# Patient Record
Sex: FEMALE | Race: WHITE | HISPANIC OR LATINO | URBAN - METROPOLITAN AREA
[De-identification: names, ages, dates, MRNs, and addresses within clinical notes are randomized per-mention and may not be internally consistent; named-entity substitution may affect disease eponyms.]

---

## 2017-08-21 ENCOUNTER — INPATIENT (INPATIENT)
Facility: HOSPITAL | Age: 62
LOS: 5 days | Discharge: HOME | DRG: 603 | End: 2017-08-27
Attending: HOSPITALIST | Admitting: INTERNAL MEDICINE
Payer: MEDICAID

## 2017-08-21 VITALS
HEART RATE: 87 BPM | OXYGEN SATURATION: 98 % | RESPIRATION RATE: 18 BRPM | DIASTOLIC BLOOD PRESSURE: 87 MMHG | TEMPERATURE: 99 F | SYSTOLIC BLOOD PRESSURE: 142 MMHG

## 2017-08-21 PROCEDURE — 99285 EMERGENCY DEPT VISIT HI MDM: CPT | Mod: 25

## 2017-08-21 PROCEDURE — 10060 I&D ABSCESS SIMPLE/SINGLE: CPT

## 2017-08-21 RX ADMIN — Medication 100 MILLIGRAM(S): at 23:38

## 2017-08-21 NOTE — ED PROCEDURE NOTE - ATTENDING CONTRIBUTION TO CARE
I have participated in and supervised all key portions of the above procedures and agree with the above documentation. CA Almonte MD

## 2017-08-21 NOTE — ED PROCEDURE NOTE - PROCEDURE ADDITIONAL DETAILS
procedure performed with static ultrasound guidance; images are available in [employer] quality review software.    Fani

## 2017-08-22 ENCOUNTER — TRANSCRIPTION ENCOUNTER (OUTPATIENT)
Age: 62
End: 2017-08-22

## 2017-08-22 DIAGNOSIS — E87.1 HYPO-OSMOLALITY AND HYPONATREMIA: ICD-10-CM

## 2017-08-22 DIAGNOSIS — L03.90 CELLULITIS, UNSPECIFIED: ICD-10-CM

## 2017-08-22 DIAGNOSIS — Z29.9 ENCOUNTER FOR PROPHYLACTIC MEASURES, UNSPECIFIED: ICD-10-CM

## 2017-08-22 DIAGNOSIS — R73.9 HYPERGLYCEMIA, UNSPECIFIED: ICD-10-CM

## 2017-08-22 DIAGNOSIS — L03.116 CELLULITIS OF LEFT LOWER LIMB: ICD-10-CM

## 2017-08-22 LAB
ALBUMIN SERPL ELPH-MCNC: 3.9 G/DL — SIGNIFICANT CHANGE UP (ref 3.3–5)
ALP SERPL-CCNC: 77 U/L — SIGNIFICANT CHANGE UP (ref 40–120)
ALT FLD-CCNC: 15 U/L RC — SIGNIFICANT CHANGE UP (ref 10–45)
ANION GAP SERPL CALC-SCNC: 14 MMOL/L — SIGNIFICANT CHANGE UP (ref 5–17)
ANION GAP SERPL CALC-SCNC: 16 MMOL/L — SIGNIFICANT CHANGE UP (ref 5–17)
APPEARANCE UR: CLEAR — SIGNIFICANT CHANGE UP
AST SERPL-CCNC: 13 U/L — SIGNIFICANT CHANGE UP (ref 10–40)
BASOPHILS # BLD AUTO: 0.1 K/UL — SIGNIFICANT CHANGE UP (ref 0–0.2)
BASOPHILS NFR BLD AUTO: 0.6 % — SIGNIFICANT CHANGE UP (ref 0–2)
BILIRUB SERPL-MCNC: 0.4 MG/DL — SIGNIFICANT CHANGE UP (ref 0.2–1.2)
BILIRUB UR-MCNC: NEGATIVE — SIGNIFICANT CHANGE UP
BUN SERPL-MCNC: 11 MG/DL — SIGNIFICANT CHANGE UP (ref 7–23)
BUN SERPL-MCNC: 9 MG/DL — SIGNIFICANT CHANGE UP (ref 7–23)
CALCIUM SERPL-MCNC: 8.8 MG/DL — SIGNIFICANT CHANGE UP (ref 8.4–10.5)
CALCIUM SERPL-MCNC: 9.4 MG/DL — SIGNIFICANT CHANGE UP (ref 8.4–10.5)
CHLORIDE SERPL-SCNC: 93 MMOL/L — LOW (ref 96–108)
CHLORIDE SERPL-SCNC: 98 MMOL/L — SIGNIFICANT CHANGE UP (ref 96–108)
CO2 SERPL-SCNC: 22 MMOL/L — SIGNIFICANT CHANGE UP (ref 22–31)
CO2 SERPL-SCNC: 26 MMOL/L — SIGNIFICANT CHANGE UP (ref 22–31)
COLOR SPEC: YELLOW — SIGNIFICANT CHANGE UP
CREAT SERPL-MCNC: 0.68 MG/DL — SIGNIFICANT CHANGE UP (ref 0.5–1.3)
CREAT SERPL-MCNC: 0.81 MG/DL — SIGNIFICANT CHANGE UP (ref 0.5–1.3)
DIFF PNL FLD: NEGATIVE — SIGNIFICANT CHANGE UP
EOSINOPHIL # BLD AUTO: 0.4 K/UL — SIGNIFICANT CHANGE UP (ref 0–0.5)
EOSINOPHIL NFR BLD AUTO: 3.5 % — SIGNIFICANT CHANGE UP (ref 0–6)
GLUCOSE SERPL-MCNC: 246 MG/DL — HIGH (ref 70–99)
GLUCOSE SERPL-MCNC: 345 MG/DL — HIGH (ref 70–99)
GLUCOSE UR QL: 500 MG/DL
HCT VFR BLD CALC: 41.3 % — SIGNIFICANT CHANGE UP (ref 34.5–45)
HCT VFR BLD CALC: 42.9 % — SIGNIFICANT CHANGE UP (ref 34.5–45)
HGB BLD-MCNC: 13.6 G/DL — SIGNIFICANT CHANGE UP (ref 11.5–15.5)
HGB BLD-MCNC: 14.2 G/DL — SIGNIFICANT CHANGE UP (ref 11.5–15.5)
KETONES UR-MCNC: ABNORMAL
LEUKOCYTE ESTERASE UR-ACNC: NEGATIVE — SIGNIFICANT CHANGE UP
LYMPHOCYTES # BLD AUTO: 2.9 K/UL — SIGNIFICANT CHANGE UP (ref 1–3.3)
LYMPHOCYTES # BLD AUTO: 28.5 % — SIGNIFICANT CHANGE UP (ref 13–44)
MCHC RBC-ENTMCNC: 29.1 PG — SIGNIFICANT CHANGE UP (ref 27–34)
MCHC RBC-ENTMCNC: 29.4 PG — SIGNIFICANT CHANGE UP (ref 27–34)
MCHC RBC-ENTMCNC: 33 GM/DL — SIGNIFICANT CHANGE UP (ref 32–36)
MCHC RBC-ENTMCNC: 33.1 GM/DL — SIGNIFICANT CHANGE UP (ref 32–36)
MCV RBC AUTO: 88 FL — SIGNIFICANT CHANGE UP (ref 80–100)
MCV RBC AUTO: 89 FL — SIGNIFICANT CHANGE UP (ref 80–100)
MONOCYTES # BLD AUTO: 0.7 K/UL — SIGNIFICANT CHANGE UP (ref 0–0.9)
MONOCYTES NFR BLD AUTO: 7 % — SIGNIFICANT CHANGE UP (ref 2–14)
NEUTROPHILS # BLD AUTO: 6.1 K/UL — SIGNIFICANT CHANGE UP (ref 1.8–7.4)
NEUTROPHILS NFR BLD AUTO: 60.5 % — SIGNIFICANT CHANGE UP (ref 43–77)
NITRITE UR-MCNC: NEGATIVE — SIGNIFICANT CHANGE UP
PH UR: 7 — SIGNIFICANT CHANGE UP (ref 5–8)
PLATELET # BLD AUTO: 216 K/UL — SIGNIFICANT CHANGE UP (ref 150–400)
PLATELET # BLD AUTO: 224 K/UL — SIGNIFICANT CHANGE UP (ref 150–400)
POTASSIUM SERPL-MCNC: 3.9 MMOL/L — SIGNIFICANT CHANGE UP (ref 3.5–5.3)
POTASSIUM SERPL-MCNC: 3.9 MMOL/L — SIGNIFICANT CHANGE UP (ref 3.5–5.3)
POTASSIUM SERPL-SCNC: 3.9 MMOL/L — SIGNIFICANT CHANGE UP (ref 3.5–5.3)
POTASSIUM SERPL-SCNC: 3.9 MMOL/L — SIGNIFICANT CHANGE UP (ref 3.5–5.3)
PROT SERPL-MCNC: 7.4 G/DL — SIGNIFICANT CHANGE UP (ref 6–8.3)
PROT UR-MCNC: NEGATIVE MG/DL — SIGNIFICANT CHANGE UP
RBC # BLD: 4.63 M/UL — SIGNIFICANT CHANGE UP (ref 3.8–5.2)
RBC # BLD: 4.88 M/UL — SIGNIFICANT CHANGE UP (ref 3.8–5.2)
RBC # FLD: 11.3 % — SIGNIFICANT CHANGE UP (ref 10.3–14.5)
RBC # FLD: 11.4 % — SIGNIFICANT CHANGE UP (ref 10.3–14.5)
SODIUM SERPL-SCNC: 131 MMOL/L — LOW (ref 135–145)
SODIUM SERPL-SCNC: 138 MMOL/L — SIGNIFICANT CHANGE UP (ref 135–145)
SP GR SPEC: 1.01 — LOW (ref 1.01–1.02)
UROBILINOGEN FLD QL: NEGATIVE MG/DL — SIGNIFICANT CHANGE UP
WBC # BLD: 10.1 K/UL — SIGNIFICANT CHANGE UP (ref 3.8–10.5)
WBC # BLD: 9.6 K/UL — SIGNIFICANT CHANGE UP (ref 3.8–10.5)
WBC # FLD AUTO: 10.1 K/UL — SIGNIFICANT CHANGE UP (ref 3.8–10.5)
WBC # FLD AUTO: 9.6 K/UL — SIGNIFICANT CHANGE UP (ref 3.8–10.5)

## 2017-08-22 PROCEDURE — 99223 1ST HOSP IP/OBS HIGH 75: CPT

## 2017-08-22 RX ORDER — INSULIN LISPRO 100/ML
VIAL (ML) SUBCUTANEOUS
Qty: 0 | Refills: 0 | Status: DISCONTINUED | OUTPATIENT
Start: 2017-08-22 | End: 2017-08-24

## 2017-08-22 RX ORDER — HEPARIN SODIUM 5000 [USP'U]/ML
5000 INJECTION INTRAVENOUS; SUBCUTANEOUS EVERY 8 HOURS
Qty: 0 | Refills: 0 | Status: DISCONTINUED | OUTPATIENT
Start: 2017-08-22 | End: 2017-08-27

## 2017-08-22 RX ORDER — INSULIN LISPRO 100/ML
5 VIAL (ML) SUBCUTANEOUS
Qty: 0 | Refills: 0 | Status: DISCONTINUED | OUTPATIENT
Start: 2017-08-22 | End: 2017-08-24

## 2017-08-22 RX ORDER — SODIUM CHLORIDE 9 MG/ML
1000 INJECTION INTRAMUSCULAR; INTRAVENOUS; SUBCUTANEOUS ONCE
Qty: 0 | Refills: 0 | Status: COMPLETED | OUTPATIENT
Start: 2017-08-22 | End: 2017-08-22

## 2017-08-22 RX ORDER — VANCOMYCIN HCL 1 G
1250 VIAL (EA) INTRAVENOUS EVERY 12 HOURS
Qty: 0 | Refills: 0 | Status: DISCONTINUED | OUTPATIENT
Start: 2017-08-22 | End: 2017-08-23

## 2017-08-22 RX ORDER — INSULIN GLARGINE 100 [IU]/ML
15 INJECTION, SOLUTION SUBCUTANEOUS AT BEDTIME
Qty: 0 | Refills: 0 | Status: DISCONTINUED | OUTPATIENT
Start: 2017-08-22 | End: 2017-08-24

## 2017-08-22 RX ORDER — INSULIN LISPRO 100/ML
VIAL (ML) SUBCUTANEOUS AT BEDTIME
Qty: 0 | Refills: 0 | Status: DISCONTINUED | OUTPATIENT
Start: 2017-08-22 | End: 2017-08-27

## 2017-08-22 RX ORDER — ACETAMINOPHEN 500 MG
650 TABLET ORAL EVERY 6 HOURS
Qty: 0 | Refills: 0 | Status: DISCONTINUED | OUTPATIENT
Start: 2017-08-22 | End: 2017-08-27

## 2017-08-22 RX ADMIN — HEPARIN SODIUM 5000 UNIT(S): 5000 INJECTION INTRAVENOUS; SUBCUTANEOUS at 16:20

## 2017-08-22 RX ADMIN — Medication 166.67 MILLIGRAM(S): at 18:11

## 2017-08-22 RX ADMIN — Medication 2: at 21:48

## 2017-08-22 RX ADMIN — Medication 5 UNIT(S): at 12:14

## 2017-08-22 RX ADMIN — SODIUM CHLORIDE 1000 MILLILITER(S): 9 INJECTION INTRAMUSCULAR; INTRAVENOUS; SUBCUTANEOUS at 01:28

## 2017-08-22 RX ADMIN — Medication 5 UNIT(S): at 18:07

## 2017-08-22 RX ADMIN — INSULIN GLARGINE 15 UNIT(S): 100 INJECTION, SOLUTION SUBCUTANEOUS at 22:54

## 2017-08-22 RX ADMIN — Medication 6: at 12:16

## 2017-08-22 RX ADMIN — Medication 166.67 MILLIGRAM(S): at 09:30

## 2017-08-22 RX ADMIN — Medication 2: at 18:10

## 2017-08-22 RX ADMIN — HEPARIN SODIUM 5000 UNIT(S): 5000 INJECTION INTRAVENOUS; SUBCUTANEOUS at 21:48

## 2017-08-22 RX ADMIN — Medication 4: at 09:48

## 2017-08-22 NOTE — DISCHARGE NOTE ADULT - PLAN OF CARE
Bactrim DS BID until 9/2/17  Follow up with your primary care MD Improved.  US with no abscess or DVT, S/p IV Vancomycin with ID following. Improved BS levels. continue diabetic medications  follow up with your pmd continue diabetic medications  follow up with Dr. Cervantes upon discharge.

## 2017-08-22 NOTE — DISCHARGE NOTE ADULT - CARE PROVIDER_API CALL
Dylon Cervantes (DO), Internal Medicine  [address] [place] [address]  Phone: [phone]  Fax: [phone]

## 2017-08-22 NOTE — H&P ADULT - HISTORY OF PRESENT ILLNESS
This is a 60 y/o female with h/o allergy induced asthma who presented with left thigh erythema for 4 days. Pt travels frequently was in [place] a week ago and sustained several bug bites on her left lower extremity in addition to one on her left shin. The patient thought nothing of them, but did notice they took longer to go away than usual. The one on her left shin became more red, hot and swollen and developed a scab over it. She then traveled to [place] when on Saturday she believes she may have received another bug bite, but this time was on the upper portion of her left thigh. It soon developed increased swelling, expanding erythema and warmth. She james a [place] around the area, and when the erythema expanded outside of the area she went to a urgent care facility. She was prescribed Bactrim, the first dose when she took Sunday evening. She took Bactrim again on Monday morning, took a flight back to [place], and when she landed, felt as though the erythema continued to expand and went to another urgent care who sent her to the ED for evaluation. Pt denied systemic symptoms, no fevers, chills, night sweats, N/V, palpitations. Additionally, pt reported that she had been diagnosed with pre-diabetes 20 years ago, was started on Metformin, lost 60 lbs and was taken off the medication.

## 2017-08-22 NOTE — ED PROVIDER NOTE - MEDICAL DECISION MAKING DETAILS
62 yo F with no known pmh, c/o redness on left thigh and shin at sites of bug bites; thigh has become more red/warm to touch; on exam, has palpable fluctuance with significant area of erythema on left anterior thigh; smaller area with no fluctuance around left shin; concerning for cellulitis; small abscess (will perofrm I&D) and will start clindamycin (failure of po abx with bactrim).  will check labs given multiple areas of skin infections; r/o DM.

## 2017-08-22 NOTE — DISCHARGE NOTE ADULT - ADDITIONAL INSTRUCTIONS
Make appointment with your PMD in 1 week Make appointment with your PMD in 1 week.  Patient will find a local PMD and will make an appointment,

## 2017-08-22 NOTE — H&P ADULT - PROBLEM SELECTOR PLAN 1
Pt presented with erythema, pain, and tenderness of left anterior thigh approx [id number], demarcated after sustaining a bug bite to the area. Pt took 2 doses of Bactrim DS with continued expansion of erythema. A/W inguinal lymphadenopathy. No systemic symptoms. Afebrile, no elevation in WBC. s/p I/D in ED with drainage of blood. Received Clindamycin.   - Will treat as purulent cellulitis, start Vancomycin 1250mg Q12. Check trough prior to 4th dose  - f/u bcx  - Tylenol prn fever

## 2017-08-22 NOTE — DISCHARGE NOTE ADULT - CARE PLAN
Principal Discharge DX:	Cellulitis and abscess  Goal:	Improved.  US with no abscess or DVT, S/p IV Vancomycin with ID following.  Instructions for follow-up, activity and diet:	Bactrim DS BID until 9/2/17  Follow up with your primary care MD  Secondary Diagnosis:	Diabetes mellitus  Goal:	Improved BS levels.  Instructions for follow-up, activity and diet:	continue diabetic medications  follow up with your pmd Principal Discharge DX:	Cellulitis and abscess  Goal:	Improved.  US with no abscess or DVT, S/p IV Vancomycin with ID following.  Instructions for follow-up, activity and diet:	Bactrim DS BID until 9/2/17  Follow up with your primary care MD  Secondary Diagnosis:	Diabetes mellitus  Goal:	Improved BS levels.  Instructions for follow-up, activity and diet:	continue diabetic medications  follow up with Dr. Cervantes upon discharge.

## 2017-08-22 NOTE — H&P ADULT - ASSESSMENT
62 y/o female with h/o allergy induced asthma who presented with left thigh erythema for 4 days c/w cellulitis

## 2017-08-22 NOTE — H&P ADULT - NSHPLABSRESULTS_GEN_ALL_CORE
.  LABS:                         14.2   10.1  )-----------( 224      ( 21 Aug 2017 23:30 )             42.9     08-21    131<L>  |  93<L>  |  11  ----------------------------<  345<H>  3.9   |  22  |  0.81    Ca    9.4      21 Aug 2017 23:30    TPro  7.4  /  Alb  3.9  /  TBili  0.4  /  DBili  x   /  AST  13  /  ALT  15  /  AlkPhos  77  08-21                  RADIOLOGY, EKG & ADDITIONAL TESTS: EKG personally reviewed, DEANNA

## 2017-08-22 NOTE — H&P ADULT - PROBLEM SELECTOR PLAN 3
Blood glucose elevated to 345 on admission lab work. Pt reported h/o pre diabetes in the past, on Metformin but was taken off 2/2 improved glucose control in the setting of 60 lb weight loss. Received 1L NS repeat  post fluid administration.   - A1c pending  - Will start pt on weight based insulin dosing. At 0.4u/kg, TDD would be 34 units. Will start Lantus 15units QHS and Humalog 5 units premeal.   - Monitor FS, adjust insulin dosing accordingly. Blood glucose elevated to 345 on admission lab work. Pt reported h/o pre diabetes in the past, on Metformin but was taken off 2/2 improved glucose control in the setting of 60 lb weight loss. Received 1L NS repeat  post fluid administration.   - A1c pending, check UA  - Will start pt on weight based insulin dosing. At 0.4u/kg, TDD would be 34 units. Will start Lantus 15units QHS and Humalog 5 units premeal.   - Monitor FS, adjust insulin dosing accordingly.

## 2017-08-22 NOTE — DISCHARGE NOTE ADULT - HOSPITAL COURSE
to be completed by attending MD. 62 y/o female with h/o allergy induced asthma who presented with left thigh erythema for 4 days. Pt travels frequently was in [place] a week ago and sustained several bug bites on her left lower extremity in addition to one on her left shin. The patient thought nothing of them, but did notice they took longer to go away than usual. The one on her left shin became more red, hot and swollen and developed a scab over it. She then traveled to [place] when on Saturday she believes she may have received another bug bite, but this time was on the upper portion of her left thigh. It soon developed increased swelling, expanding erythema and warmth. She james a [place] around the area, and when the erythema expanded outside of the area she went to a urgent care facility. She was prescribed Bactrim, the first dose when she took Sunday evening. She took Bactrim again on Monday morning, took a flight back to [place], and when she landed, felt as though the erythema continued to expand and went to another urgent care who sent her to the ED for evaluation. Pt denied systemic symptoms, no fevers, chills, night sweats, N/V, palpitations. Additionally, pt reported that she had been diagnosed with pre-diabetes 20 years ago, was started on Metformin, lost 60 lbs and was taken off the medication.        patient admitted with cellulitis and hyperglycemia. She was started on vancomycin 1250mg q12hr. ID was consulted. Vanc troughs were monitored. Dopplers were negative for abscess or dvt of the left thigh. Endocrine adjusted the insulin. Cellulitis improved. Patient to be discharged on 70/30 due to insurance issues and bactrim ds 1 tab through 9/2 for cellulitis. 60 y/o female with h/o allergy induced asthma who presented with left thigh erythema for 4 days. Pt travels frequently was in [place] a week ago and sustained several bug bites on her left lower extremity in addition to one on her left shin. The patient thought nothing of them, but did notice they took longer to go away than usual. The one on her left shin became more red, hot and swollen and developed a scab over it. She then traveled to [place] when on Saturday she believes she may have received another bug bite, but this time was on the upper portion of her left thigh. It soon developed increased swelling, expanding erythema and warmth. She james a [place] around the area, and when the erythema expanded outside of the area she went to a urgent care facility. She was prescribed Bactrim, the first dose when she took Sunday evening. She took Bactrim again on Monday morning, took a flight back to [place], and when she landed, felt as though the erythema continued to expand and went to another urgent care who sent her to the ED for evaluation. Pt denied systemic symptoms, no fevers, chills, night sweats, N/V, palpitations. Additionally, pt reported that she had been diagnosed with pre-diabetes 20 years ago, was started on Metformin, lost 60 lbs and was taken off the medication.      Hospital Course:  ER attempted I and D but there was no purulent drainage. Patient admitted with cellulitis of LLE near her thigh area and hyperglycemia. She was started on vancomycin 1250mg q12hr.  There was minimal improvement. so zosyn was added. ID was consulted. Vanc troughs were monitored and increased. Dopplers were negative for abscess or dvt of the left thigh. A1c was 13. Endocrine adjusted the insulin as patient was a newly diagnosed insulin dependent diabetes. Cellulitis improved on a few day of IV antibiotics. she had no leukocytosis, remained afebrile, and HD stable. Patient to be discharged on 70/30 due to insurance issues and bactrim ds 1 tab bid for another 7 days through 9/2 for cellulitis.     Discharge Diagnosis:  Cellulitis of left leg  Uncontrolled type 2 diabetes mellitus with hyperglycemia, unspecified long term insulin use status (Newly Diagnosed)  Hyponatremia

## 2017-08-22 NOTE — H&P ADULT - FAMILY HISTORY
Mother  Still living? Unknown  Family history of diabetes mellitus, Age at diagnosis: Age Unknown  Family history of coronary artery disease, Age at diagnosis: Age Unknown

## 2017-08-22 NOTE — DISCHARGE NOTE ADULT - MEDICATION SUMMARY - MEDICATIONS TO STOP TAKING
I will STOP taking the medications listed below when I get home from the hospital:    nystatin  --  by mouth

## 2017-08-22 NOTE — H&P ADULT - NSHPPHYSICALEXAM_GEN_ALL_CORE
.  VITAL SIGNS:  T(C): 36.8 (08-22-17 @ 04:29), Max: 37.2 (08-21-17 @ 21:16)  T(F): 98.3 (08-22-17 @ 04:29), Max: 98.9 (08-21-17 @ 21:16)  HR: 65 (08-22-17 @ 04:29) (65 - 87)  BP: 112/60 (08-22-17 @ 04:29) (112/60 - 142/87)  BP(mean): --  RR: 16 (08-22-17 @ 04:29) (16 - 20)  SpO2: 97% (08-22-17 @ 04:29) (97% - 100%)  Wt(kg): --    PHYSICAL EXAM:    Constitutional: WDWN resting comfortably in bed; NAD  Head: NC/AT  Eyes: PERRL, EOMI, anicteric sclera  ENT: no nasal discharge; uvula midline, no oropharyngeal erythema or exudates; MMM  Neck: supple; no JVD or thyromegaly  Respiratory: CTA B/L; no W/R/R  Cardiac: +S1/S2; RRR; no M/R/G  Gastrointestinal: soft, NT/ND; no rebound or guarding; +BSx4  Back: spine midline, no bony tenderness or step-offs; no CVAT B/L  Extremities: WWP, no clubbing or cyanosis; no peripheral edema  Musculoskeletal: NROM x4; no joint swelling, tenderness or erythema  Vascular: 2+ radial, femoral, DP/PT pulses B/L  Dermatologic: Left upper thigh circumferential erythema, warmth and tenderness, approximately [id number], demarcated. 3x5cm erythematous lesion on anterior left shin with overlying scab.  Lymphatic: Left inguinal lymphadenopathy   Neurologic: AAOx3; CNII-XII grossly intact; no focal deficits  Psychiatric: affect and characteristics of appearance, verbalizations, behaviors are appropriate

## 2017-08-22 NOTE — DISCHARGE NOTE ADULT - MEDICATION SUMMARY - MEDICATIONS TO TAKE
I will START or STAY ON the medications listed below when I get home from the hospital:    Alcohol Swabs  -- Check FS before meals and bedtime.  Diagnosis: DM2   ICD: E13.10    Disp: 2 months supply      -- Indication: For Diabetes mellitus    Glucometer  -- Check Fingerstick before meals and at bedtime  Dx: DM2   ICD: E13.10  Disp: 1  -- Indication: For Diabetes mellitus    Glucose test strips  -- Check Fingerstick before meals and at bedtime  Dx: DM2   ICD: E13.10  Disp: 2 months supply  -- Indication: For Diabetes mellitus    Lancets  -- Check Fingerstick before meals and at bedtime  Dx: DM2   ICD: E13.10  Disp: 2 month supply  -- Indication: For Diabetes mellitus    insulin syringeswith needles  -- Check Fingerstick before meals and at bedtime  Dx: DM2   ICD: E13.10  Disp: 1 box  -- Indication: For Diabetes mellitus    NovoLIN 70/30 subcutaneous suspension  -- 1 dose(s) subcutaneous 2 times a day  20 u before breakfast  14 u beofre dinner     Check FS 4x a day. Dx: DM2 ICD: E13.10 Disp: 1 Vial  -- Do not drink alcoholic beverages when taking this medication.  It is very important that you take or use this exactly as directed.  Do not skip doses or discontinue unless directed by your doctor.  Keep in refrigerator.  Do not freeze.    -- Indication: For Diabetes mellitus    albuterol 90 mcg/inh inhalation aerosol with adapter  -- 2 puff(s) inhaled 4 times a day, As Needed for SOB/wheezing  -- Indication: For asthma    Bactrim  mg-160 mg oral tablet  -- 1 tab(s) by mouth 2 times a day  take until 9/2/17  -- Avoid prolonged or excessive exposure to direct and/or artificial sunlight while taking this medication.  Finish all this medication unless otherwise directed by prescriber.  Medication should be taken with plenty of water.    -- Indication: For Cellulitis    Flovent  mcg/inh inhalation aerosol  -- 2 puff(s) inhaled 2 times a day  -- Indication: For asthma

## 2017-08-22 NOTE — ED PROVIDER NOTE - OBJECTIVE STATEMENT
61 year old female reports left thigh bug bite which has become erythematous and tender since onset 3-4 days ago. She was given Bactrim DS 1 tab PO BID at an urgent care yesterday but the size of the red area has increased in size since starting the medication. She has no PMD and no recent follow up .

## 2017-08-22 NOTE — ED PROVIDER NOTE - ATTENDING CONTRIBUTION TO CARE
I have examined and evaluated this patient with the above resident or PA, and agree with the documented clinical history, exam and plan.   Briefly: 62 yo F with no known pmh, c/o redness on left thigh and shin at sites of bug bites; thigh has become more red/warm to touch; on exam, has palpable fluctuance with significant area of erythema on left anterior thigh; smaller area with no fluctuance around left shin; concerning for cellulitis; small abscess (will perofrm I&D) and will start clindamycin (failure of po abx with bactrim).     Minimal pus and some blood drained from wound.    Labs notable for hyperglycemia, suggesting new onset DM; will admit to medicine for new onset DM and cellulitis with failed po ABx. I have examined and evaluated this patient with the above resident or PA, and agree with the documented clinical history, exam and plan.   Briefly: 60 yo F with no known pmh, c/o redness on left thigh and shin at sites of bug bites; thigh has become more red/warm to touch; on exam, has palpable fluctuance with significant area of erythema on left anterior thigh; smaller area with no fluctuance around left shin; concerning for cellulitis; small abscess (will perofrm I&D) and will start clindamycin (failure of po abx with bactrim).     Minimal pus and some blood drained from wound.    Labs notable for hyperglycemia, suggesting new onset DM; will admit to medicine for new onset DM and cellulitis with failed po ABx.

## 2017-08-22 NOTE — DISCHARGE NOTE ADULT - NS AS DC STROKE ED MATERIALS
Stroke Warning Signs and Symptoms/Call 911 for Stroke/Need for Followup After Discharge/Stroke Education Booklet/Risk Factors for Stroke/Prescribed Medications

## 2017-08-22 NOTE — DISCHARGE NOTE ADULT - MEDICATION SUMMARY - MEDICATIONS TO CHANGE
I will SWITCH the dose or number of times a day I take the medications listed below when I get home from the hospital:    Bactrim  --  by mouth

## 2017-08-23 DIAGNOSIS — E11.65 TYPE 2 DIABETES MELLITUS WITH HYPERGLYCEMIA: ICD-10-CM

## 2017-08-23 LAB
ANION GAP SERPL CALC-SCNC: 12 MMOL/L — SIGNIFICANT CHANGE UP (ref 5–17)
BUN SERPL-MCNC: 13 MG/DL — SIGNIFICANT CHANGE UP (ref 7–23)
CALCIUM SERPL-MCNC: 9.6 MG/DL — SIGNIFICANT CHANGE UP (ref 8.4–10.5)
CHLORIDE SERPL-SCNC: 102 MMOL/L — SIGNIFICANT CHANGE UP (ref 96–108)
CO2 SERPL-SCNC: 23 MMOL/L — SIGNIFICANT CHANGE UP (ref 22–31)
CREAT SERPL-MCNC: 0.69 MG/DL — SIGNIFICANT CHANGE UP (ref 0.5–1.3)
GLUCOSE SERPL-MCNC: 234 MG/DL — HIGH (ref 70–99)
HCT VFR BLD CALC: 39.4 % — SIGNIFICANT CHANGE UP (ref 34.5–45)
HGB BLD-MCNC: 13.5 G/DL — SIGNIFICANT CHANGE UP (ref 11.5–15.5)
MAGNESIUM SERPL-MCNC: 1.7 MG/DL — SIGNIFICANT CHANGE UP (ref 1.6–2.6)
MCHC RBC-ENTMCNC: 29.5 PG — SIGNIFICANT CHANGE UP (ref 27–34)
MCHC RBC-ENTMCNC: 34.3 GM/DL — SIGNIFICANT CHANGE UP (ref 32–36)
MCV RBC AUTO: 86 FL — SIGNIFICANT CHANGE UP (ref 80–100)
PLATELET # BLD AUTO: 245 K/UL — SIGNIFICANT CHANGE UP (ref 150–400)
POTASSIUM SERPL-MCNC: 4.3 MMOL/L — SIGNIFICANT CHANGE UP (ref 3.5–5.3)
POTASSIUM SERPL-SCNC: 4.3 MMOL/L — SIGNIFICANT CHANGE UP (ref 3.5–5.3)
RBC # BLD: 4.58 M/UL — SIGNIFICANT CHANGE UP (ref 3.8–5.2)
RBC # FLD: 12.6 % — SIGNIFICANT CHANGE UP (ref 10.3–14.5)
SODIUM SERPL-SCNC: 137 MMOL/L — SIGNIFICANT CHANGE UP (ref 135–145)
VANCOMYCIN TROUGH SERPL-MCNC: 5.4 UG/ML — LOW (ref 10–20)
VANCOMYCIN TROUGH SERPL-MCNC: 7.5 UG/ML — LOW (ref 10–20)
WBC # BLD: 8.27 K/UL — SIGNIFICANT CHANGE UP (ref 3.8–10.5)
WBC # FLD AUTO: 8.27 K/UL — SIGNIFICANT CHANGE UP (ref 3.8–10.5)

## 2017-08-23 PROCEDURE — 99233 SBSQ HOSP IP/OBS HIGH 50: CPT

## 2017-08-23 RX ORDER — VANCOMYCIN HCL 1 G
1500 VIAL (EA) INTRAVENOUS EVERY 12 HOURS
Qty: 0 | Refills: 0 | Status: DISCONTINUED | OUTPATIENT
Start: 2017-08-23 | End: 2017-08-23

## 2017-08-23 RX ORDER — FLUTICASONE PROPIONATE 220 MCG
2 AEROSOL WITH ADAPTER (GRAM) INHALATION
Qty: 0 | Refills: 0 | Status: DISCONTINUED | OUTPATIENT
Start: 2017-08-23 | End: 2017-08-27

## 2017-08-23 RX ORDER — VANCOMYCIN HCL 1 G
1000 VIAL (EA) INTRAVENOUS EVERY 8 HOURS
Qty: 0 | Refills: 0 | Status: DISCONTINUED | OUTPATIENT
Start: 2017-08-24 | End: 2017-08-27

## 2017-08-23 RX ORDER — ALBUTEROL 90 UG/1
2 AEROSOL, METERED ORAL EVERY 6 HOURS
Qty: 0 | Refills: 0 | Status: DISCONTINUED | OUTPATIENT
Start: 2017-08-23 | End: 2017-08-27

## 2017-08-23 RX ORDER — MAGNESIUM SULFATE 500 MG/ML
1 VIAL (ML) INJECTION ONCE
Qty: 0 | Refills: 0 | Status: COMPLETED | OUTPATIENT
Start: 2017-08-23 | End: 2017-08-23

## 2017-08-23 RX ORDER — PIPERACILLIN AND TAZOBACTAM 4; .5 G/20ML; G/20ML
3.38 INJECTION, POWDER, LYOPHILIZED, FOR SOLUTION INTRAVENOUS EVERY 8 HOURS
Qty: 0 | Refills: 0 | Status: DISCONTINUED | OUTPATIENT
Start: 2017-08-23 | End: 2017-08-25

## 2017-08-23 RX ADMIN — INSULIN GLARGINE 15 UNIT(S): 100 INJECTION, SOLUTION SUBCUTANEOUS at 21:37

## 2017-08-23 RX ADMIN — HEPARIN SODIUM 5000 UNIT(S): 5000 INJECTION INTRAVENOUS; SUBCUTANEOUS at 21:37

## 2017-08-23 RX ADMIN — Medication 4: at 09:02

## 2017-08-23 RX ADMIN — HEPARIN SODIUM 5000 UNIT(S): 5000 INJECTION INTRAVENOUS; SUBCUTANEOUS at 06:02

## 2017-08-23 RX ADMIN — Medication 166.67 MILLIGRAM(S): at 17:48

## 2017-08-23 RX ADMIN — Medication 5 UNIT(S): at 09:02

## 2017-08-23 RX ADMIN — Medication 2: at 17:47

## 2017-08-23 RX ADMIN — HEPARIN SODIUM 5000 UNIT(S): 5000 INJECTION INTRAVENOUS; SUBCUTANEOUS at 13:54

## 2017-08-23 RX ADMIN — Medication 5 UNIT(S): at 13:09

## 2017-08-23 RX ADMIN — Medication 100 GRAM(S): at 17:47

## 2017-08-23 RX ADMIN — PIPERACILLIN AND TAZOBACTAM 25 GRAM(S): 4; .5 INJECTION, POWDER, LYOPHILIZED, FOR SOLUTION INTRAVENOUS at 21:36

## 2017-08-23 RX ADMIN — Medication 5 UNIT(S): at 17:47

## 2017-08-23 RX ADMIN — Medication 2 PUFF(S): at 17:48

## 2017-08-23 RX ADMIN — Medication 2: at 13:10

## 2017-08-23 RX ADMIN — Medication 166.67 MILLIGRAM(S): at 06:01

## 2017-08-23 RX ADMIN — PIPERACILLIN AND TAZOBACTAM 25 GRAM(S): 4; .5 INJECTION, POWDER, LYOPHILIZED, FOR SOLUTION INTRAVENOUS at 13:54

## 2017-08-23 NOTE — PROGRESS NOTE ADULT - PROBLEM SELECTOR PLAN 1
Worsening cellulitis on exam with erythema extending outside demarcated area in all directions with small area of induration right above I and D site which is well healed. No s/s of obvious abscess or drainage  -will add zosyn 3.375mg q8 for additional coverage given worsening exam and DM  -c/w vanc 1250mg q12  -afebrile, HD stable, no leukocytosis, bcx NGTD  -if no improvement in cellulitis over 1-2 days, will consider repeat Ext US to r/o abscess +/- surgery and ID consult Worsening cellulitis on exam with erythema extending outside demarcated area in all directions with small area of induration right above I and D site which is well healed. No s/s of obvious abscess, drainage, or deep tissue infection   -will add zosyn 3.375mg q8 for additional coverage given worsening exam and DM  -c/w vanc 1250mg q12  -afebrile, HD stable, no leukocytosis, bcx NGTD  -if no improvement in cellulitis over 1-2 days, will consider repeat Ext US to r/o abscess +/- surgery and ID consult

## 2017-08-23 NOTE — PROGRESS NOTE ADULT - PROBLEM SELECTOR PLAN 3
A1c 13, FS still uncontrolled in 200-300, insulin naive  -will c/w lantus 15 units qhs  -c/w aspart 5 units tid with meals  -c/w pilar  -monitor FS, will consider adjustment tomorrow as insulin naive  -nursing is doing insulin teaching

## 2017-08-23 NOTE — PROGRESS NOTE ADULT - SUBJECTIVE AND OBJECTIVE BOX
Patient is a 61y old  Female who presents with a chief complaint of Erythema of skin (22 Aug 2017 22:48)      SUBJECTIVE / OVERNIGHT EVENTS: Reports worsening redness in LLE now extending outside of demarcated area made on Monday. Also has tender Left sided inguinal lymphadenopathy. No drainage from site but still area of hardness. No associated f/c/n/v. Denies HIV risks factors and verbally agreed to consent to sent HIV. Also reports boils in left armpit and left lower leg.    MEDICATIONS  (STANDING):  heparin  Injectable 5000 Unit(s) SubCutaneous every 8 hours  insulin lispro (HumaLOG) corrective regimen sliding scale   SubCutaneous three times a day before meals  vancomycin  IVPB 1250 milliGRAM(s) IV Intermittent every 12 hours  insulin glargine Injectable (LANTUS) 15 Unit(s) SubCutaneous at bedtime  insulin lispro Injectable (HumaLOG) 5 Unit(s) SubCutaneous three times a day before meals  insulin lispro (HumaLOG) corrective regimen sliding scale   SubCutaneous at bedtime  fluticasone propionate   110 MICROgram(s) HFA Inhaler 2 Puff(s) Inhalation two times a day  piperacillin/tazobactam IVPB. 3.375 Gram(s) IV Intermittent every 8 hours    MEDICATIONS  (PRN):  acetaminophen   Tablet. 650 milliGRAM(s) Oral every 6 hours PRN Moderate Pain (4 - 6)  ALBUTerol    90 MICROgram(s) HFA Inhaler 2 Puff(s) Inhalation every 6 hours PRN Shortness of Breath and/or Wheezing      Vital Signs Last 24 Hrs  T(C): 36.6 (23 Aug 2017 13:07), Max: 37.1 (22 Aug 2017 16:00)  T(F): 97.8 (23 Aug 2017 13:07), Max: 98.7 (22 Aug 2017 16:00)  HR: 62 (23 Aug 2017 13:07) (62 - 76)  BP: 119/78 (23 Aug 2017 13:07) (103/67 - 119/78)  BP(mean): --  RR: 17 (23 Aug 2017 13:07) (17 - 20)  SpO2: 96% (23 Aug 2017 13:07) (96% - 99%)  CAPILLARY BLOOD GLUCOSE  211 (23 Aug 2017 08:54)  304 (22 Aug 2017 21:23)  183 (22 Aug 2017 18:04)        I&O's Summary    22 Aug 2017 07:01  -  23 Aug 2017 07:00  --------------------------------------------------------  IN: 1870 mL / OUT: 0 mL / NET: 1870 mL        PHYSICAL EXAM:  GENERAL: NAD, well-developed, nontoxic  HEAD:  Atraumatic, Normocephalic  EYES: EOMI, PERRLA, conjunctiva and sclera clear  NECK: Supple, No JVD  CHEST/LUNG: Clear to auscultation bilaterally; No wheeze  HEART: Regular rate and rhythm; No murmurs, rubs, or gallops  ABDOMEN: Soft, Nontender, Nondistended; Bowel sounds present  EXTREMITIES:  2+ Peripheral Pulses, No clubbing, cyanosis, or edema  PSYCH: AAOx3  NEUROLOGY: non-focal  SKIN: Erythema of left lower anterior thigh now extending outside previously demarcated area, spreading inferiorly, medially and laterally by at least 3cm each direction. +Warmth, tenderness, Small area of induration but no fluctuance near previous I and D site which is healing well with no pus or drainage. Patient agreeable to insulin on discharge. Small boil in left anterior tibia and left arm pit with no sign of cellulitis. Scattered bug bites on legs and arms.   Lymph: palpable tender lymphadenopathy in left groin.    LABS:                        13.5   8.27  )-----------( 245      ( 23 Aug 2017 07:29 )             39.4     08-    137  |  102  |  13  ----------------------------<  234<H>  4.3   |  23  |  0.69    Ca    9.6      23 Aug 2017 07:23  Mg     1.7     08-    TPro  7.4  /  Alb  3.9  /  TBili  0.4  /  DBili  x   /  AST  13  /  ALT  15  /  AlkPhos  77  08-      vanc trough this AM 5.4 (but check prior to 3rd dose not 4th)     Urinalysis Basic - ( 22 Aug 2017 15:50 )  Color: Yellow / Appearance: Clear / S.009 / pH: x  Gluc: x / Ketone: Trace  / Bili: Negative / Urobili: Negative mg/dL   Blood: x / Protein: Negative mg/dL / Nitrite: Negative   Leuk Esterase: Negative / RBC: x / WBC x   Sq Epi: x / Non Sq Epi: x / Bacteria: x Patient is a 61y old  Female who presents with a chief complaint of Erythema of skin (22 Aug 2017 22:48)      SUBJECTIVE / OVERNIGHT EVENTS: Reports worsening redness in LLE now extending outside of demarcated area made on Monday. Also has tender Left sided inguinal lymphadenopathy. No drainage from site but still area of hardness. No associated f/c/n/v. Denies HIV risks factors and verbally agreed to consent to sent HIV. Also reports boils in left armpit and left lower leg. Patient agreeable to insulin on discharge    MEDICATIONS  (STANDING):  heparin  Injectable 5000 Unit(s) SubCutaneous every 8 hours  insulin lispro (HumaLOG) corrective regimen sliding scale   SubCutaneous three times a day before meals  vancomycin  IVPB 1250 milliGRAM(s) IV Intermittent every 12 hours  insulin glargine Injectable (LANTUS) 15 Unit(s) SubCutaneous at bedtime  insulin lispro Injectable (HumaLOG) 5 Unit(s) SubCutaneous three times a day before meals  insulin lispro (HumaLOG) corrective regimen sliding scale   SubCutaneous at bedtime  fluticasone propionate   110 MICROgram(s) HFA Inhaler 2 Puff(s) Inhalation two times a day  piperacillin/tazobactam IVPB. 3.375 Gram(s) IV Intermittent every 8 hours    MEDICATIONS  (PRN):  acetaminophen   Tablet. 650 milliGRAM(s) Oral every 6 hours PRN Moderate Pain (4 - 6)  ALBUTerol    90 MICROgram(s) HFA Inhaler 2 Puff(s) Inhalation every 6 hours PRN Shortness of Breath and/or Wheezing      Vital Signs Last 24 Hrs  T(C): 36.6 (23 Aug 2017 13:07), Max: 37.1 (22 Aug 2017 16:00)  T(F): 97.8 (23 Aug 2017 13:07), Max: 98.7 (22 Aug 2017 16:00)  HR: 62 (23 Aug 2017 13:07) (62 - 76)  BP: 119/78 (23 Aug 2017 13:07) (103/67 - 119/78)  BP(mean): --  RR: 17 (23 Aug 2017 13:07) (17 - 20)  SpO2: 96% (23 Aug 2017 13:07) (96% - 99%)  CAPILLARY BLOOD GLUCOSE  211 (23 Aug 2017 08:54)  304 (22 Aug 2017 21:23)  183 (22 Aug 2017 18:04)        I&O's Summary    22 Aug 2017 07:01  -  23 Aug 2017 07:00  --------------------------------------------------------  IN: 1870 mL / OUT: 0 mL / NET: 1870 mL        PHYSICAL EXAM:  GENERAL: NAD, well-developed, nontoxic  HEAD:  Atraumatic, Normocephalic  EYES: EOMI, PERRLA, conjunctiva and sclera clear  NECK: Supple, No JVD  CHEST/LUNG: Clear to auscultation bilaterally; No wheeze  HEART: Regular rate and rhythm; No murmurs, rubs, or gallops  ABDOMEN: Soft, Nontender, Nondistended; Bowel sounds present  EXTREMITIES:  2+ Peripheral Pulses, No clubbing, cyanosis, or edema  PSYCH: AAOx3  NEUROLOGY: non-focal  SKIN: Erythema of left lower anterior thigh now extending outside previously demarcated area, spreading inferiorly, medially and laterally by at least 3cm each direction. +Warmth, tenderness, Small area of induration but no fluctuance near previous I and D site which is healing well with no pus, drainage, or crepitus.  Small boil in left anterior tibia and left arm pit with no sign of cellulitis. Scattered bug bites on legs and arms.   Lymph: palpable tender lymphadenopathy in left groin.    LABS:                        13.5   8.27  )-----------( 245      ( 23 Aug 2017 07:29 )             39.4     08-    137  |  102  |  13  ----------------------------<  234<H>  4.3   |  23  |  0.69    Ca    9.6      23 Aug 2017 07:23  Mg     1.7         TPro  7.4  /  Alb  3.9  /  TBili  0.4  /  DBili  x   /  AST  13  /  ALT  15  /  AlkPhos  77        vanc trough this AM 5.4 (but check prior to 3rd dose not 4th)   bcx ngtd    Urinalysis Basic - ( 22 Aug 2017 15:50 )  Color: Yellow / Appearance: Clear / S.009 / pH: x  Gluc: x / Ketone: Trace  / Bili: Negative / Urobili: Negative mg/dL   Blood: x / Protein: Negative mg/dL / Nitrite: Negative   Leuk Esterase: Negative / RBC: x / WBC x   Sq Epi: x / Non Sq Epi: x / Bacteria: x

## 2017-08-24 LAB
ANION GAP SERPL CALC-SCNC: 13 MMOL/L — SIGNIFICANT CHANGE UP (ref 5–17)
BASOPHILS # BLD AUTO: 0.04 K/UL — SIGNIFICANT CHANGE UP (ref 0–0.2)
BASOPHILS NFR BLD AUTO: 0.6 % — SIGNIFICANT CHANGE UP (ref 0–2)
BUN SERPL-MCNC: 10 MG/DL — SIGNIFICANT CHANGE UP (ref 7–23)
CALCIUM SERPL-MCNC: 8.8 MG/DL — SIGNIFICANT CHANGE UP (ref 8.4–10.5)
CHLORIDE SERPL-SCNC: 103 MMOL/L — SIGNIFICANT CHANGE UP (ref 96–108)
CO2 SERPL-SCNC: 24 MMOL/L — SIGNIFICANT CHANGE UP (ref 22–31)
CREAT SERPL-MCNC: 0.63 MG/DL — SIGNIFICANT CHANGE UP (ref 0.5–1.3)
EOSINOPHIL # BLD AUTO: 0.43 K/UL — SIGNIFICANT CHANGE UP (ref 0–0.5)
EOSINOPHIL NFR BLD AUTO: 6.8 % — HIGH (ref 0–6)
GLUCOSE SERPL-MCNC: 189 MG/DL — HIGH (ref 70–99)
HCT VFR BLD CALC: 39.8 % — SIGNIFICANT CHANGE UP (ref 34.5–45)
HGB BLD-MCNC: 13.5 G/DL — SIGNIFICANT CHANGE UP (ref 11.5–15.5)
IMM GRANULOCYTES NFR BLD AUTO: 0.2 % — SIGNIFICANT CHANGE UP (ref 0–1.5)
LYMPHOCYTES # BLD AUTO: 2.57 K/UL — SIGNIFICANT CHANGE UP (ref 1–3.3)
LYMPHOCYTES # BLD AUTO: 40.5 % — SIGNIFICANT CHANGE UP (ref 13–44)
MAGNESIUM SERPL-MCNC: 1.9 MG/DL — SIGNIFICANT CHANGE UP (ref 1.6–2.6)
MCHC RBC-ENTMCNC: 29.1 PG — SIGNIFICANT CHANGE UP (ref 27–34)
MCHC RBC-ENTMCNC: 33.9 GM/DL — SIGNIFICANT CHANGE UP (ref 32–36)
MCV RBC AUTO: 85.8 FL — SIGNIFICANT CHANGE UP (ref 80–100)
MONOCYTES # BLD AUTO: 0.5 K/UL — SIGNIFICANT CHANGE UP (ref 0–0.9)
MONOCYTES NFR BLD AUTO: 7.9 % — SIGNIFICANT CHANGE UP (ref 2–14)
NEUTROPHILS # BLD AUTO: 2.79 K/UL — SIGNIFICANT CHANGE UP (ref 1.8–7.4)
NEUTROPHILS NFR BLD AUTO: 44 % — SIGNIFICANT CHANGE UP (ref 43–77)
PLATELET # BLD AUTO: 247 K/UL — SIGNIFICANT CHANGE UP (ref 150–400)
POTASSIUM SERPL-MCNC: 4 MMOL/L — SIGNIFICANT CHANGE UP (ref 3.5–5.3)
POTASSIUM SERPL-SCNC: 4 MMOL/L — SIGNIFICANT CHANGE UP (ref 3.5–5.3)
RBC # BLD: 4.64 M/UL — SIGNIFICANT CHANGE UP (ref 3.8–5.2)
RBC # FLD: 12.4 % — SIGNIFICANT CHANGE UP (ref 10.3–14.5)
SODIUM SERPL-SCNC: 140 MMOL/L — SIGNIFICANT CHANGE UP (ref 135–145)
VANCOMYCIN TROUGH SERPL-MCNC: 11.5 UG/ML — SIGNIFICANT CHANGE UP (ref 10–20)
WBC # BLD: 6.34 K/UL — SIGNIFICANT CHANGE UP (ref 3.8–10.5)
WBC # FLD AUTO: 6.34 K/UL — SIGNIFICANT CHANGE UP (ref 3.8–10.5)

## 2017-08-24 PROCEDURE — 93971 EXTREMITY STUDY: CPT | Mod: 26

## 2017-08-24 PROCEDURE — 99233 SBSQ HOSP IP/OBS HIGH 50: CPT

## 2017-08-24 PROCEDURE — 76881 US COMPL JOINT R-T W/IMG: CPT | Mod: 26,LT

## 2017-08-24 PROCEDURE — 99254 IP/OBS CNSLTJ NEW/EST MOD 60: CPT

## 2017-08-24 PROCEDURE — 99223 1ST HOSP IP/OBS HIGH 75: CPT | Mod: GC

## 2017-08-24 RX ORDER — INSULIN LISPRO 100/ML
8 VIAL (ML) SUBCUTANEOUS
Qty: 0 | Refills: 0 | Status: DISCONTINUED | OUTPATIENT
Start: 2017-08-24 | End: 2017-08-25

## 2017-08-24 RX ORDER — INSULIN GLARGINE 100 [IU]/ML
18 INJECTION, SOLUTION SUBCUTANEOUS AT BEDTIME
Qty: 0 | Refills: 0 | Status: DISCONTINUED | OUTPATIENT
Start: 2017-08-24 | End: 2017-08-25

## 2017-08-24 RX ORDER — INSULIN LISPRO 100/ML
VIAL (ML) SUBCUTANEOUS
Qty: 0 | Refills: 0 | Status: DISCONTINUED | OUTPATIENT
Start: 2017-08-24 | End: 2017-08-27

## 2017-08-24 RX ORDER — DIPHENHYDRAMINE HCL 50 MG
25 CAPSULE ORAL EVERY 4 HOURS
Qty: 0 | Refills: 0 | Status: COMPLETED | OUTPATIENT
Start: 2017-08-24 | End: 2017-08-24

## 2017-08-24 RX ADMIN — HEPARIN SODIUM 5000 UNIT(S): 5000 INJECTION INTRAVENOUS; SUBCUTANEOUS at 22:21

## 2017-08-24 RX ADMIN — Medication 25 MILLIGRAM(S): at 22:21

## 2017-08-24 RX ADMIN — Medication 5 UNIT(S): at 10:05

## 2017-08-24 RX ADMIN — Medication 5 UNIT(S): at 13:20

## 2017-08-24 RX ADMIN — Medication 250 MILLIGRAM(S): at 05:28

## 2017-08-24 RX ADMIN — Medication 2 PUFF(S): at 05:29

## 2017-08-24 RX ADMIN — INSULIN GLARGINE 18 UNIT(S): 100 INJECTION, SOLUTION SUBCUTANEOUS at 22:23

## 2017-08-24 RX ADMIN — PIPERACILLIN AND TAZOBACTAM 25 GRAM(S): 4; .5 INJECTION, POWDER, LYOPHILIZED, FOR SOLUTION INTRAVENOUS at 13:20

## 2017-08-24 RX ADMIN — HEPARIN SODIUM 5000 UNIT(S): 5000 INJECTION INTRAVENOUS; SUBCUTANEOUS at 05:28

## 2017-08-24 RX ADMIN — Medication 4: at 13:20

## 2017-08-24 RX ADMIN — Medication 2: at 10:05

## 2017-08-24 RX ADMIN — Medication 2 PUFF(S): at 17:56

## 2017-08-24 RX ADMIN — Medication 250 MILLIGRAM(S): at 22:21

## 2017-08-24 RX ADMIN — PIPERACILLIN AND TAZOBACTAM 25 GRAM(S): 4; .5 INJECTION, POWDER, LYOPHILIZED, FOR SOLUTION INTRAVENOUS at 22:24

## 2017-08-24 RX ADMIN — Medication 250 MILLIGRAM(S): at 13:20

## 2017-08-24 RX ADMIN — PIPERACILLIN AND TAZOBACTAM 25 GRAM(S): 4; .5 INJECTION, POWDER, LYOPHILIZED, FOR SOLUTION INTRAVENOUS at 05:28

## 2017-08-24 RX ADMIN — Medication 8 UNIT(S): at 17:51

## 2017-08-24 NOTE — PROGRESS NOTE ADULT - PROBLEM SELECTOR PLAN 1
-Extent of Left thigh cellulitis worsening but erythema and tenderness improved. No s/s of obvious abscess, drainage, or deep tissue infection   --afebrile, HD stable, no leukocytosis, bcx NGTD  -c/w zosyn 3.375mg q8  (8/23-  -vanc trough 7.5, will increase to vanc 1gram q8 and recheck before 4th dose (unless otherwise rec by ID)  -c/w vanc 1250mg q12  -ID consulted recs pending  -will get US LL Extremity  to r/o abscess +/- surgery consult

## 2017-08-24 NOTE — CONSULT NOTE ADULT - SUBJECTIVE AND OBJECTIVE BOX
Patient is a 61y old  Female who presents with a chief complaint of Erythema of skin (22 Aug 2017 22:48)      HPI:  This is a 62 y/o female with h/o allergy induced asthma who presented with left thigh erythema for 4 days. Pt travels frequently was in [place] a week ago and sustained several bug bites on her left lower extremity in addition to one on her left shin. The patient thought nothing of them, but did notice they took longer to go away than usual. The one on her left shin became more red, hot and swollen and developed a scab over it. She then traveled to [place] when on Saturday she believes she may have received another bug bite, but this time was on the upper portion of her left thigh. It soon developed increased swelling, expanding erythema and warmth. She james a [place] around the area, and when the erythema expanded outside of the area she went to a urgent care facility. She was prescribed Bactrim, the first dose when she took  evening. She took Bactrim again on Monday morning, took a flight back to [place], and when she landed, felt as though the erythema continued to expand and went to another urgent care who sent her to the ED for evaluation. Pt denied systemic symptoms, no fevers, chills, night sweats, N/V, palpitations. Additionally, pt reported that she had been diagnosed with pre-diabetes 20 years ago, was started on Metformin, lost 60 lbs and was taken off the medication. (22 Aug 2017 09:19)    In the ED on Monday, patient was afebrile and w/o white count. ED performed an I&D of a small abscess on anterior thigh with drainage of blood and gave 1 dose of Clindamycin and 1 dose of Vancomycin. On admission pt was continued on Vancomycin 1250 mg q12hr w/ worsening of her erythema so Zosyn 3.375 mg q8hr was added by the primary team.    PAST MEDICAL & SURGICAL HISTORY:  No pertinent past medical history  No significant past surgical history     Social History:    Marital Status:   Occupation:   Lives with:     Substance Use :  Tobacco Usage: Nonsmoker  Alcohol Usage: No  Travel: 1 week ago traveled to [place] and in [place] last weekend  Pets:      FAMILY HISTORY:  Family history of coronary artery disease (Mother)  Family history of diabetes mellitus (Mother)      REVIEW OF SYSTEMS  General:	Denies any malaise fatigue or chills. Fevers absent  Ophthalmologic:Denies any visual complaints,discharge redness or photophobia  ENMT:No nasal discharge,headache,sinus congestion or throat pain.No dental complaints  Respiratory and Thorax:No cough,sputum or chest pain.Denies shortness of breath  Cardiovascular:	No chest pain,palpitaions or dizziness  Gastrointestinal:	NO nausea,abdominal pain or diarrhea.  Genitourinary:	No dysuria,frequency. No flank pain  Musculoskeletal:	No joint swelling or pain.No weakness  Neurological:No confusion,diziness.No extremity weakness.No bladder or bowel incontinence	  Psychiatric:No delusions or hallucinations	  Endocrine:	No recent weight gain or loss.No abnormal heat/cold intolerance  Allergic/Immunologic:	No hives or rash     Allergies  No Known Allergies    Antimicrobials:  piperacillin/tazobactam IVPB. 3.375 Gram(s) IV Intermittent every 8 hours  vancomycin  IVPB 1000 milliGRAM(s) IV Intermittent every 8 hours    MEDICATIONS  (STANDING):  heparin  Injectable 5000 Unit(s) SubCutaneous every 8 hours  insulin lispro (HumaLOG) corrective regimen sliding scale   SubCutaneous at bedtime  fluticasone propionate   110 MICROgram(s) HFA Inhaler 2 Puff(s) Inhalation two times a day  piperacillin/tazobactam IVPB. 3.375 Gram(s) IV Intermittent every 8 hours  vancomycin  IVPB 1000 milliGRAM(s) IV Intermittent every 8 hours    MEDICATIONS  (PRN):  acetaminophen   Tablet. 650 milliGRAM(s) Oral every 6 hours PRN Moderate Pain (4 - 6)  ALBUTerol    90 MICROgram(s) HFA Inhaler 2 Puff(s) Inhalation every 6 hours PRN Shortness of Breath and/or Wheezing    Vital Signs Last 24 Hrs  T(C): 36.9 (24 Aug 2017 12:51), Max: 36.9 (24 Aug 2017 12:51)  T(F): 98.5 (24 Aug 2017 12:51), Max: 98.5 (24 Aug 2017 12:51)  HR: 58 (24 Aug 2017 12:51) (58 - 69)  BP: 111/76 (24 Aug 2017 12:51) (101/65 - 122/90)  BP(mean): --  RR: 18 (24 Aug 2017 12:51) (16 - 20)  SpO2: 97% (24 Aug 2017 12:51) (97% - 99%)    PHYSICAL EXAM:  Constitutional: resting comfortably in bed; NAD  Eyes: PERRL, EOMI, anicteric sclera  ENT: no nasal discharge; uvula midline, no oropharyngeal erythema or exudates; MMM  Neck: supple; no JVD or thyromegaly  Respiratory: CTA B/L; no W/R/R  Cardiac: +S1/S2; RRR; no M/R/G  Gastrointestinal: soft, NT/ND; no rebound or guarding; +BSx4  Extremities: no clubbing or cyanosis; no peripheral edema  Musculoskeletal: no joint swelling, tenderness or erythema  Dermatologic: Left upper thigh circumferential erythema, warmth and tenderness, approximately [id number], demarcated. 3x5cm erythematous lesion on anterior left shin with overlying scab.  Lymphatic: Left inguinal lymphadenopathy   Neurologic: AAOx3; no focal deficits  Psychiatric: affect and characteristics of appearance, verbalizations, behaviors are appropriate    LABS:                        13.5   6.34  )-----------( 247      ( 24 Aug 2017 07:34 )             39.8         -    140  |  103  |  10  ----------------------------<  189<H>  4.0   |  24  |  0.63    Ca    8.8      24 Aug 2017 07:28  Mg     1.9             Vancomycin Level, Trough: 7.5 ug/mL ( @ 17:40)  Vancomycin Level, Trough: 5.4 ug/mL ( @ 06:30)      Urinalysis Basic - ( 22 Aug 2017 15:50 )  Color: Yellow / Appearance: Clear / S.009 / pH: x  Gluc: x / Ketone: Trace  / Bili: Negative / Urobili: Negative mg/dL   Blood: x / Protein: Negative mg/dL / Nitrite: Negative   Leuk Esterase: Negative / RBC: x / WBC x   Sq Epi: x / Non Sq Epi: x / Bacteria: x        RECENT CULTURES:  MICROBIOLOGY:  NGTD        Radiology: Patient is a 61y old  Female who presents with a chief complaint of Erythema of skin (22 Aug 2017 22:48)      HPI:  62 y/o female with h/o allergy induced asthma who presented to the ED on Monday with left thigh erythema for 4 days. 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 2 weeks ago she was in [place] and sustained several "fire ant bites" on her lower extremities. Last Thursday she returned home to her new apartment in [facility] which is located in a very woodsy area she noticed another bite on her left anterior shin. The bite on her left shin became more red, hot and swollen and developed a scab over it. On Friday she traveled to [place] for work and on Saturday morning she woke up with an area of redness and swelling on the left upper thigh. The area soon developed increased swelling, expanding erythema and warmth. She reports pain 5/10 and itchiness in the area. She james a [place] around the area, and when the erythema expanded outside of the area she went to a urgent care facility. In the urgent care she was prescribed Bactrim, which she took 3 doses of without improvement. On Monday morning, she returned to [place], and when she landed, felt as though the erythema continued to expand and went to another urgent care who sent her to the ED for evaluation. The patient also reports that she noticed developing small areas of redness and swelling in her groin and beneath her armpit. During her travels she denies any outdoor activities including hiking and swimming. She denies noticing any tick bites. She denies any contact with animals except for her friends dog but she denies any scratches or bites. Pt denied systemic symptoms, no fevers, chills, night sweats, N/V, palpitations, diarrhea, dysuria.    In the ED on Monday, patient was afebrile and w/o white count, of note she had a glucose level of 364. ED performed an I&D of a small abscess on anterior thigh with drainage of blood and gave 1 dose of Clindamycin and 1 dose of Vancomycin. On admission pt was continued on Vancomycin 1250 mg q12hr w/ worsening of her erythema so Zosyn 3.375 mg q8hr was added by the primary team.    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 3.375 Gram(s) IV Intermittent every 8 hours  vancomycin  IVPB 1000 milliGRAM(s) IV Intermittent every 8 hours    MEDICATIONS  (STANDING):  heparin  Injectable 5000 Unit(s) SubCutaneous every 8 hours  insulin lispro (HumaLOG) corrective regimen sliding scale   SubCutaneous at bedtime  fluticasone propionate   110 MICROgram(s) HFA Inhaler 2 Puff(s) Inhalation two times a day  piperacillin/tazobactam IVPB. 3.375 Gram(s) IV Intermittent every 8 hours  vancomycin  IVPB 1000 milliGRAM(s) IV Intermittent every 8 hours    MEDICATIONS  (PRN):  acetaminophen   Tablet. 650 milliGRAM(s) Oral every 6 hours PRN Moderate Pain (4 - 6)  ALBUTerol    90 MICROgram(s) HFA Inhaler 2 Puff(s) Inhalation every 6 hours PRN Shortness of Breath and/or Wheezing    Vital Signs Last 24 Hrs  T(C): 36.9 (24 Aug 2017 12:51), Max: 36.9 (24 Aug 2017 12:51)  T(F): 98.5 (24 Aug 2017 12:51), Max: 98.5 (24 Aug 2017 12:51)  HR: 58 (24 Aug 2017 12:51) (58 - 69)  BP: 111/76 (24 Aug 2017 12:51) (101/65 - 122/90)  BP(mean): --  RR: 18 (24 Aug 2017 12:51) (16 - 20)  SpO2: 97% (24 Aug 2017 12:51) (97% - 99%)    PHYSICAL EXAM:  Constitutional: resting comfortably in bed; NAD  Eyes: PERRL, EOMI, anicteric sclera  ENT: no nasal discharge; uvula midline, no oropharyngeal erythema or exudates; MMM  Neck: supple; no JVD or thyromegaly  Respiratory: CTA B/L; no W/R/R  Cardiac: +S1/S2; RRR; no M/R/G  Gastrointestinal: soft, NT/ND; no rebound or guarding; +BSx4  Extremities: no clubbing or cyanosis; no peripheral edema  Musculoskeletal: no joint swelling, tenderness or erythema  Dermatologic: Left upper thigh circumferential erythema, warmth and tenderness, approximately [id number], demarcated. 3x5cm erythematous lesion on anterior left shin with overlying scab.  Lymphatic: Left inguinal lymphadenopathy   Neurologic: AAOx3; no focal deficits  Psychiatric: affect and characteristics of appearance, verbalizations, behaviors are appropriate    LABS:                        13.5   6.34  )-----------( 247      ( 24 Aug 2017 07:34 )             39.8         08-    140  |  103  |  10  ----------------------------<  189<H>  4.0   |  24  |  0.63    Ca    8.8      24 Aug 2017 07:28  Mg     1.9             Vancomycin Level, Trough: 7.5 ug/mL ( @ 17:40)  Vancomycin Level, Trough: 5.4 ug/mL ( @ 06:30)      Urinalysis Basic - ( 22 Aug 2017 15:50 )  Color: Yellow / Appearance: Clear / S.009 / pH: x  Gluc: x / Ketone: Trace  / Bili: Negative / Urobili: Negative mg/dL   Blood: x / Protein: Negative mg/dL / Nitrite: Negative   Leuk Esterase: Negative / RBC: x / WBC x   Sq Epi: x / Non Sq Epi: x / Bacteria: x        RECENT CULTURES:  MICROBIOLOGY:  NGTD        Radiology: Patient is a 61y old  Female who presents with a chief complaint of Erythema of skin (22 Aug 2017 22:48)      HPI:  62 y/o female with h/o allergy induced asthma who presented to the ED on Monday with left thigh erythema for 4 days. 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 2 weeks ago she was in [place] and sustained several "fire ant bites" on her lower extremities. Last Thursday she returned home to her new apartment in [facility] which is located in a very woodsy area she noticed another bite on her left anterior shin. The bite on her left shin became more red, hot and swollen and developed a scab over it. On Friday she traveled to [place] for work and on Saturday morning she woke up with an area of redness and swelling on the left upper thigh. The area soon developed increased swelling, expanding erythema and warmth. She reports pain 5/10 and itchiness in the area. She james a [place] around the area, and when the erythema expanded outside of the area she went to a urgent care facility. In the urgent care she was prescribed Bactrim, which she took 3 doses of without improvement. On Monday morning, she returned to [place], and when she landed, felt as though the erythema continued to expand and went to another urgent care who sent her to the ED for evaluation. The patient also reports that she noticed developing small areas of redness and swelling in her groin and beneath her armpit. During her travels she denies any outdoor activities including hiking and swimming. She denies noticing any tick bites. She denies any contact with animals except for her friends dog but she denies any scratches or bites. Pt denied systemic symptoms, no fevers, chills, night sweats, N/V, palpitations, diarrhea, dysuria.    In the ED on Monday, patient was afebrile and w/o white count, of note she had a glucose level of 345. ED performed an I&D of a small abscess on anterior thigh with drainage of blood and gave 1 dose of Clindamycin and 1 dose of Vancomycin. On admission pt was continued on Vancomycin 1250 mg q12hr w/ worsening of her erythema so Zosyn 3.375 mg q8hr was added by the primary team.    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 3.375 Gram(s) IV Intermittent every 8 hours  vancomycin  IVPB 1000 milliGRAM(s) IV Intermittent every 8 hours    MEDICATIONS  (STANDING):  heparin  Injectable 5000 Unit(s) SubCutaneous every 8 hours  insulin lispro (HumaLOG) corrective regimen sliding scale   SubCutaneous at bedtime  fluticasone propionate   110 MICROgram(s) HFA Inhaler 2 Puff(s) Inhalation two times a day  piperacillin/tazobactam IVPB. 3.375 Gram(s) IV Intermittent every 8 hours  vancomycin  IVPB 1000 milliGRAM(s) IV Intermittent every 8 hours    MEDICATIONS  (PRN):  acetaminophen   Tablet. 650 milliGRAM(s) Oral every 6 hours PRN Moderate Pain (4 - 6)  ALBUTerol    90 MICROgram(s) HFA Inhaler 2 Puff(s) Inhalation every 6 hours PRN Shortness of Breath and/or Wheezing    Vital Signs Last 24 Hrs  T(C): 36.9 (24 Aug 2017 12:51), Max: 36.9 (24 Aug 2017 12:51)  T(F): 98.5 (24 Aug 2017 12:51), Max: 98.5 (24 Aug 2017 12:51)  HR: 58 (24 Aug 2017 12:51) (58 - 69)  BP: 111/76 (24 Aug 2017 12:51) (101/65 - 122/90)  BP(mean): --  RR: 18 (24 Aug 2017 12:51) (16 - 20)  SpO2: 97% (24 Aug 2017 12:51) (97% - 99%)    PHYSICAL EXAM:  Constitutional: resting comfortably in bed; NAD  Eyes: PERRL, EOMI, anicteric sclera  ENT: no nasal discharge; uvula midline, no oropharyngeal erythema or exudates; MMM  Neck: supple; no JVD or thyromegaly  Respiratory: CTA B/L; no W/R/R  Cardiac: +S1/S2; RRR; no M/R/G  Gastrointestinal: soft, NT/ND; no rebound or guarding; +BSx4  Extremities: no clubbing or cyanosis; no peripheral edema  Musculoskeletal: no joint swelling, tenderness or erythema  Dermatologic: Left upper thigh circumferential erythema, warmth and tenderness, approximately [id number], demarcated. 3x5cm erythematous lesion on anterior left shin with overlying scab.  Lymphatic: Left inguinal lymphadenopathy   Neurologic: AAOx3; no focal deficits  Psychiatric: affect and characteristics of appearance, verbalizations, behaviors are appropriate    LABS:                        13.5   6.34  )-----------( 247      ( 24 Aug 2017 07:34 )             39.8         08-    140  |  103  |  10  ----------------------------<  189<H>  4.0   |  24  |  0.63    Ca    8.8      24 Aug 2017 07:28  Mg     1.9             Vancomycin Level, Trough: 7.5 ug/mL ( @ 17:40)  Vancomycin Level, Trough: 5.4 ug/mL ( @ 06:30)      Urinalysis Basic - ( 22 Aug 2017 15:50 )  Color: Yellow / Appearance: Clear / S.009 / pH: x  Gluc: x / Ketone: Trace  / Bili: Negative / Urobili: Negative mg/dL   Blood: x / Protein: Negative mg/dL / Nitrite: Negative   Leuk Esterase: Negative / RBC: x / WBC x   Sq Epi: x / Non Sq Epi: x / Bacteria: x        RECENT CULTURES:  MICROBIOLOGY:  NGTD        Radiology:

## 2017-08-24 NOTE — PROGRESS NOTE ADULT - SUBJECTIVE AND OBJECTIVE BOX
Patient is a 61y old  Female who presents with a chief complaint of Erythema of skin (22 Aug 2017 22:48)      SUBJECTIVE / OVERNIGHT EVENTS: No o/v events. Feels well, no fever/chills. Pain of LLE improved and she is able to ambulate more comfortably and left her leg up higher. Reports that her cellulitis look less red overall and  has becomes less hard however developed tiny bumps and is spreading outside of outlined area from yesterday    MEDICATIONS  (STANDING):  heparin  Injectable 5000 Unit(s) SubCutaneous every 8 hours  insulin lispro (HumaLOG) corrective regimen sliding scale   SubCutaneous three times a day before meals  insulin glargine Injectable (LANTUS) 15 Unit(s) SubCutaneous at bedtime  insulin lispro Injectable (HumaLOG) 5 Unit(s) SubCutaneous three times a day before meals  insulin lispro (HumaLOG) corrective regimen sliding scale   SubCutaneous at bedtime  fluticasone propionate   110 MICROgram(s) HFA Inhaler 2 Puff(s) Inhalation two times a day  piperacillin/tazobactam IVPB. 3.375 Gram(s) IV Intermittent every 8 hours  vancomycin  IVPB 1000 milliGRAM(s) IV Intermittent every 8 hours    MEDICATIONS  (PRN):  acetaminophen   Tablet. 650 milliGRAM(s) Oral every 6 hours PRN Moderate Pain (4 - 6)  ALBUTerol    90 MICROgram(s) HFA Inhaler 2 Puff(s) Inhalation every 6 hours PRN Shortness of Breath and/or Wheezing      Vital Signs Last 24 Hrs  T(C): 36.9 (24 Aug 2017 12:51), Max: 36.9 (24 Aug 2017 12:51)  T(F): 98.5 (24 Aug 2017 12:51), Max: 98.5 (24 Aug 2017 12:51)  HR: 58 (24 Aug 2017 12:51) (58 - 69)  BP: 111/76 (24 Aug 2017 12:51) (101/65 - 122/90)  BP(mean): --  RR: 18 (24 Aug 2017 12:51) (16 - 20)  SpO2: 97% (24 Aug 2017 12:51) (97% - 99%)  CAPILLARY BLOOD GLUCOSE  218 (24 Aug 2017 12:44)  167 (24 Aug 2017 08:58)  210 (23 Aug 2017 21:24)  169 (23 Aug 2017 17:31)        I&O's Summary    23 Aug 2017 07:01  -  24 Aug 2017 07:00  --------------------------------------------------------  IN: 580 mL / OUT: 0 mL / NET: 580 mL    24 Aug 2017 07:  -  24 Aug 2017 13:44  --------------------------------------------------------  IN: 300 mL / OUT: 0 mL / NET: 300 mL          PHYSICAL EXAM:  GENERAL: NAD, well-developed, nontoxic  HEAD:  Atraumatic, Normocephalic  EYES: EOMI, PERRLA, conjunctiva and sclera clear  NECK: Supple, No JVD  CHEST/LUNG: Clear to auscultation bilaterally; No wheeze  HEART: Regular rate and rhythm; No murmurs, rubs, or gallops  ABDOMEN: Soft, Nontender, Nondistended; Bowel sounds present  EXTREMITIES:  2+ Peripheral Pulses, No clubbing, cyanosis, or edema  PSYCH: AAOx3  NEUROLOGY: non-focal  SKIN: Erythema of left lower anterior thigh now extending even further outside 2 previously demarcated areas, spreading inferiorly, medially and laterally but less warmth, tenderness, and less red overall.  Small area of induration but no fluctuance near previous I and D site which is healing well with no pus, drainage, or crepitus, now softer.    Lymph: palpable tender lymphadenopathy in left groin.    LABS:                        13.5   6.34  )-----------( 247      ( 24 Aug 2017 07:34 )             39.8     08-24    140  |  103  |  10  ----------------------------<  189<H>  4.0   |  24  |  0.63    Ca    8.8      24 Aug 2017 07:28  Mg     1.9     08-24    Vanc trough 7.5    Urinalysis Basic - ( 22 Aug 2017 15:50 )    Color: Yellow / Appearance: Clear / S.009 / pH: x  Gluc: x / Ketone: Trace  / Bili: Negative / Urobili: Negative mg/dL   Blood: x / Protein: Negative mg/dL / Nitrite: Negative   Leuk Esterase: Negative / RBC: x / WBC x   Sq Epi: x / Non Sq Epi: x / Bacteria: x        RADIOLOGY & ADDITIONAL TESTS:      Consultant(s) Notes Reviewed:      Care Discussed with Consultants/Other Providers: ID and Endocrine consulted

## 2017-08-24 NOTE — PROGRESS NOTE ADULT - PROBLEM SELECTOR PLAN 2
A1c 13, improved FS insulin naive  -will c/w lantus 15 units qhs  -c/w aspart 5 units tid with meals  -c/w pilar  -monitor FS  -endocrine consulted as patient unsure why she has to be on insulin  -nursing is doing insulin teaching

## 2017-08-24 NOTE — CONSULT NOTE ADULT - PROBLEM SELECTOR RECOMMENDATION 9
Diabetes Education and Nutrition Eval  Increase Lantus to 18 units qhs  Increase Humalog to 8 units qac  Low correction scale qac + bedtime  Goal glucose 100-180  Outpt. endo follow-up  Outpt. optho, podiatry, micro/cr  Plan to d/c on basal bolus vs. basal + metformin + dietary and lifestyle modifications as discussed at length depending on insulin requirements. Needs education and pen teaching in anticipation of possible insulin use upon discharge.

## 2017-08-24 NOTE — CONSULT NOTE ADULT - ASSESSMENT
60 y/o female with h/o allergy induced asthma who presented with left thigh erythema for 4 days c/w cellulitis +/- abscess of LLE with attempted I and D with in ER with minimal drainage also with newly diagnosed insulin dependent diabetes. Cellulitis was worsening after 2 days on vancomycin, Zosyn was added for additional coverage. 60 y/o female with h/o allergy induced asthma who presented with left thigh erythema for 4 days c/w cellulitis +/- abscess of LLE with attempted I and D with in ER with minimal drainage also with newly diagnosed insulin dependent diabetes. Pt received 1.5 days of Bactrim, 1 dose of Clindamycin and 2 days of vancomycin without improvement. Zosyn was added today for additional coverage.

## 2017-08-24 NOTE — CONSULT NOTE ADULT - PROBLEM SELECTOR PROBLEM 1
Cellulitis of left leg
Uncontrolled type 2 diabetes mellitus with hyperglycemia, unspecified long term insulin use status

## 2017-08-24 NOTE — CONSULT NOTE ADULT - SUBJECTIVE AND OBJECTIVE BOX
HPI:  60 y/o F w/ hx of DM Type 2 diagnosed 20 years ago. No reported complications. Last seen by optho 5/2017 without evidence of retinopathy. Took metformin when she was first diagnosed for about 1-2years and lost 60 lb. She subsequently discontinued the medication due to hypoglycemia and has been off all medications for diabetes since then. Recently moving and on a retreat over the past 3 months where her diet had been "uncontrolled."A1c now 13%. Not checking glucose at home. Denies any recent hypoglycemia or symptoms of hypoglycemia. Denies paresthesias, blurry vision, nausea or vomiting. weight loss. +polyuria and polydipsia. Father with Type 2 DM. Recently eating a lot of bread, pasta rice. Plans on limiting carbs more closely. Sometimes "juices" with beets, carrots, spinach. Walks 3-4 miles a day.   Also w/ h/o allergy induced asthma who presented with left thigh erythema for 4 days. Pt travels frequently was in [facility] a week ago and sustained several bug bites on her left lower extremity in addition to one on her left shin. The patient thought nothing of them, but did notice they took longer to go away than usual. The one on her left shin became more red, hot and swollen and developed a scab over it. She then traveled to [place] when on Saturday she believes she may have received another bug bite, but this time was on the upper portion of her left thigh. It soon developed increased swelling, expanding erythema and warmth. She james a [place] around the area, and when the erythema expanded outside of the area she went to a urgent care facility. She was prescribed Bactrim, the first dose when she took Sunday evening. She took Bactrim again on Monday morning, took a flight back to [place], and when she landed, felt as though the erythema continued to expand and went to another urgent care who sent her to the ED for evaluation. Pt denied systemic symptoms, no fevers, chills, night sweats, N/V, palpitations.       PAST MEDICAL & SURGICAL HISTORY:  Type 2 DM  No significant past surgical history      FAMILY HISTORY:  Family history of coronary artery disease (Mother)  Family history of diabetes mellitus (Father)      Social History: Denies tobacco use, + red wine with dinner almost every night    Outpatient Medications: Bactrim and Nystatin    MEDICATIONS  (STANDING):  heparin  Injectable 5000 Unit(s) SubCutaneous every 8 hours  insulin lispro (HumaLOG) corrective regimen sliding scale   SubCutaneous at bedtime  fluticasone propionate   110 MICROgram(s) HFA Inhaler 2 Puff(s) Inhalation two times a day  piperacillin/tazobactam IVPB. 3.375 Gram(s) IV Intermittent every 8 hours  vancomycin  IVPB 1000 milliGRAM(s) IV Intermittent every 8 hours  insulin lispro (HumaLOG) corrective regimen sliding scale   SubCutaneous three times a day before meals  insulin glargine Injectable (LANTUS) 18 Unit(s) SubCutaneous at bedtime  insulin lispro Injectable (HumaLOG) 8 Unit(s) SubCutaneous three times a day before meals    MEDICATIONS  (PRN):  acetaminophen   Tablet. 650 milliGRAM(s) Oral every 6 hours PRN Moderate Pain (4 - 6)  ALBUTerol    90 MICROgram(s) HFA Inhaler 2 Puff(s) Inhalation every 6 hours PRN Shortness of Breath and/or Wheezing      Allergies    No Known Allergies    Intolerances      Review of Systems:  Constitutional: No fever, No change in weight  Eyes: No blurry vision  Neuro: No headache, No paresthesias  HEENT: No throat pain  Cardiovascular: No chest pain  Respiratory: No SOB  GI: No nausea or vomiting  : + polyuria  Skin: +cellulitis on left thigh  Psych: no depression  Endocrine: + polydipsia, No heat or cold intolerance, rest as noted in HPI  Hem/lymph: no swelling    All other review of systems negative      PHYSICAL EXAM:  VITALS: T(C): 36.9 (08-24-17 @ 12:51)  T(F): 98.5 (08-24-17 @ 12:51), Max: 98.5 (08-24-17 @ 12:51)  HR: 58 (08-24-17 @ 12:51) (58 - 69)  BP: 111/76 (08-24-17 @ 12:51) (101/65 - 122/90)  RR:  (16 - 20)  SpO2:  (97% - 97%)  Wt(kg): --  GENERAL: NAD at this time  EYES: No proptosis, EOMI  HEENT:  Atraumatic, Normocephalic,   THYROID: Normal size, no palpable nodules  RESPIRATORY: Clear to auscultation bilaterally, full excursion, non-labored  CARDIOVASCULAR: Regular rhythm; No murmurs; no peripheral edema  GI: Soft, nontender, non distended, normal bowel sounds  SKIN: Dry, intact, +erythema on left thigh  MUSCULOSKELETAL: normal strength  NEURO: follows commands, no tremor, normal reflexes  PSYCH: Alert and oriented x 3, normal affect, normal mood  CUSHING'S SIGNS: no striae    CAPILLARY BLOOD GLUCOSE  218 (08-24 @ 12:44) H5 + 4  167 (08-24 @ 08:58) H5 + 2  210 (08-23 @ 21:24) L15  169 (08-23 @ 17:31) H5+ 2  211 (08-23 @ 08:54) H5 +2                                H5 + 4  304 (08-22 @ 21:23)   183 (08-22 @ 18:04)  253 (08-22 @ 12:12)  243 (08-22 @ 09:30)  264 (08-22 @ 03:38)                            13.5   6.34  )-----------( 247      ( 24 Aug 2017 07:34 )             39.8       08-24    140  |  103  |  10  ----------------------------<  189<H>  4.0   |  24  |  0.63    EGFR if : 114  EGFR if non : 98    Ca    8.8      08-24  Mg     1.9     08-24    TPro  7.4  /  Alb  3.9  /  TBili  0.4  /  DBili  x   /  AST  13  /  ALT  15  /  AlkPhos  77  08-21    Thyroid Function Tests:      Hemoglobin A1C, Whole Blood: 13.0 % <H> [4.0 - 5.6] (08-22-17 @ 07:33)        Radiology:

## 2017-08-24 NOTE — CONSULT NOTE ADULT - PROBLEM SELECTOR RECOMMENDATION 9
On exam, erythema extends outside demarcated area in all directions with small area of induration right above I and D site which is well healed. No s/s of obvious abscess, drainage, or deep tissue infection. Pt initially received 2 doses of Bactrim from urgent care, followed by Clinda and Driss in ED and was continued on Vanco without improvement. zosyn 3.375mg q8 was added yesterday for additional coverage given worsening exam and hx of DM.  -c/w vanc 1250mg q12  -afebrile, HD stable, no leukocytosis, bcx NGTD  -if no improvement in cellulitis over 1-2 days, will consider repeat Ext US to r/o abscess +/- surgery and ID consult. On exam, erythema extends outside demarcated area in all directions with small area of induration right above I and D site which is well healed. No s/s of obvious abscess, drainage, or deep tissue infection. Pt initially received 2 doses of Bactrim from urgent care, followed by Clinkrystin and Driss in ED and was continued on Vanco without improvement. zosyn 3.375mg q8 was added yesterday for additional coverage given worsening exam and hx of DM.  -f/u blood cx  -ABX: On exam, erythema seems to have decreased. Abscess present in center of left upper thigh w/o s/s of obvious drainage, or deep tissue infection. Pt initially received 2 doses of Bactrim from urgent care, followed by Clinda and Vanco in ED and was continued on Vanco without improvement. zosyn 3.375mg q8 was added yesterday for additional coverage given worsening exam and hx of DM.  -f/u blood cx  -Continue ABX per Dr. Recinos's request  -ABX: On exam, erythema seems to have decreased. Abscess present in center of left upper thigh w/o s/s of obvious drainage, or deep tissue infection. Pt initially received 2 doses of Bactrim from urgent care, followed by Clinda and Vanco in ED and was continued on Vanco without improvement. zosyn 3.375mg q8 was added yesterday for additional coverage given worsening exam and hx of DM.  -f/u blood cx  -Continue ABX

## 2017-08-25 DIAGNOSIS — L03.90 CELLULITIS, UNSPECIFIED: ICD-10-CM

## 2017-08-25 LAB
ANION GAP SERPL CALC-SCNC: 16 MMOL/L — SIGNIFICANT CHANGE UP (ref 5–17)
BASOPHILS # BLD AUTO: 0.03 K/UL — SIGNIFICANT CHANGE UP (ref 0–0.2)
BASOPHILS NFR BLD AUTO: 0.5 % — SIGNIFICANT CHANGE UP (ref 0–2)
BUN SERPL-MCNC: 11 MG/DL — SIGNIFICANT CHANGE UP (ref 7–23)
CALCIUM SERPL-MCNC: 9.1 MG/DL — SIGNIFICANT CHANGE UP (ref 8.4–10.5)
CHLORIDE SERPL-SCNC: 99 MMOL/L — SIGNIFICANT CHANGE UP (ref 96–108)
CO2 SERPL-SCNC: 25 MMOL/L — SIGNIFICANT CHANGE UP (ref 22–31)
CREAT SERPL-MCNC: 0.57 MG/DL — SIGNIFICANT CHANGE UP (ref 0.5–1.3)
EOSINOPHIL # BLD AUTO: 0.33 K/UL — SIGNIFICANT CHANGE UP (ref 0–0.5)
EOSINOPHIL NFR BLD AUTO: 5.5 % — SIGNIFICANT CHANGE UP (ref 0–6)
GLUCOSE SERPL-MCNC: 100 MG/DL — HIGH (ref 70–99)
HCT VFR BLD CALC: 39 % — SIGNIFICANT CHANGE UP (ref 34.5–45)
HGB BLD-MCNC: 13 G/DL — SIGNIFICANT CHANGE UP (ref 11.5–15.5)
IMM GRANULOCYTES NFR BLD AUTO: 0.2 % — SIGNIFICANT CHANGE UP (ref 0–1.5)
LYMPHOCYTES # BLD AUTO: 2.49 K/UL — SIGNIFICANT CHANGE UP (ref 1–3.3)
LYMPHOCYTES # BLD AUTO: 41.4 % — SIGNIFICANT CHANGE UP (ref 13–44)
MCHC RBC-ENTMCNC: 28.1 PG — SIGNIFICANT CHANGE UP (ref 27–34)
MCHC RBC-ENTMCNC: 33.3 GM/DL — SIGNIFICANT CHANGE UP (ref 32–36)
MCV RBC AUTO: 84.2 FL — SIGNIFICANT CHANGE UP (ref 80–100)
MONOCYTES # BLD AUTO: 0.45 K/UL — SIGNIFICANT CHANGE UP (ref 0–0.9)
MONOCYTES NFR BLD AUTO: 7.5 % — SIGNIFICANT CHANGE UP (ref 2–14)
NEUTROPHILS # BLD AUTO: 2.71 K/UL — SIGNIFICANT CHANGE UP (ref 1.8–7.4)
NEUTROPHILS NFR BLD AUTO: 44.9 % — SIGNIFICANT CHANGE UP (ref 43–77)
PLATELET # BLD AUTO: 304 K/UL — SIGNIFICANT CHANGE UP (ref 150–400)
POTASSIUM SERPL-MCNC: 3.8 MMOL/L — SIGNIFICANT CHANGE UP (ref 3.5–5.3)
POTASSIUM SERPL-SCNC: 3.8 MMOL/L — SIGNIFICANT CHANGE UP (ref 3.5–5.3)
RBC # BLD: 4.63 M/UL — SIGNIFICANT CHANGE UP (ref 3.8–5.2)
RBC # FLD: 12.4 % — SIGNIFICANT CHANGE UP (ref 10.3–14.5)
SODIUM SERPL-SCNC: 140 MMOL/L — SIGNIFICANT CHANGE UP (ref 135–145)
WBC # BLD: 6.02 K/UL — SIGNIFICANT CHANGE UP (ref 3.8–10.5)
WBC # FLD AUTO: 6.02 K/UL — SIGNIFICANT CHANGE UP (ref 3.8–10.5)

## 2017-08-25 PROCEDURE — 99233 SBSQ HOSP IP/OBS HIGH 50: CPT

## 2017-08-25 PROCEDURE — 99232 SBSQ HOSP IP/OBS MODERATE 35: CPT | Mod: GC

## 2017-08-25 PROCEDURE — 99232 SBSQ HOSP IP/OBS MODERATE 35: CPT

## 2017-08-25 RX ORDER — INSULIN GLARGINE 100 [IU]/ML
17 INJECTION, SOLUTION SUBCUTANEOUS AT BEDTIME
Qty: 0 | Refills: 0 | Status: DISCONTINUED | OUTPATIENT
Start: 2017-08-25 | End: 2017-08-27

## 2017-08-25 RX ORDER — INSULIN LISPRO 100/ML
7 VIAL (ML) SUBCUTANEOUS
Qty: 0 | Refills: 0 | Status: DISCONTINUED | OUTPATIENT
Start: 2017-08-25 | End: 2017-08-27

## 2017-08-25 RX ORDER — INSULIN LISPRO 100/ML
6 VIAL (ML) SUBCUTANEOUS
Qty: 0 | Refills: 0 | Status: DISCONTINUED | OUTPATIENT
Start: 2017-08-25 | End: 2017-08-25

## 2017-08-25 RX ORDER — DIPHENHYDRAMINE HCL 50 MG
25 CAPSULE ORAL ONCE
Qty: 0 | Refills: 0 | Status: COMPLETED | OUTPATIENT
Start: 2017-08-25 | End: 2017-08-25

## 2017-08-25 RX ADMIN — Medication 2 PUFF(S): at 18:07

## 2017-08-25 RX ADMIN — PIPERACILLIN AND TAZOBACTAM 25 GRAM(S): 4; .5 INJECTION, POWDER, LYOPHILIZED, FOR SOLUTION INTRAVENOUS at 05:04

## 2017-08-25 RX ADMIN — Medication 8 UNIT(S): at 09:26

## 2017-08-25 RX ADMIN — Medication 250 MILLIGRAM(S): at 21:29

## 2017-08-25 RX ADMIN — Medication 2 PUFF(S): at 05:05

## 2017-08-25 RX ADMIN — Medication 7 UNIT(S): at 18:07

## 2017-08-25 RX ADMIN — Medication 250 MILLIGRAM(S): at 14:29

## 2017-08-25 RX ADMIN — Medication 25 MILLIGRAM(S): at 23:54

## 2017-08-25 RX ADMIN — Medication 250 MILLIGRAM(S): at 05:04

## 2017-08-25 RX ADMIN — INSULIN GLARGINE 17 UNIT(S): 100 INJECTION, SOLUTION SUBCUTANEOUS at 21:28

## 2017-08-25 NOTE — DIETITIAN INITIAL EVALUATION ADULT. - ORAL INTAKE PTA
Pt reports normally eats 5 small meals daily but has been traveling a lot for work recently and was at a retreat with a  and she was eating decadent foods and eating greater quantity than usual./good

## 2017-08-25 NOTE — PROGRESS NOTE ADULT - SUBJECTIVE AND OBJECTIVE BOX
61y old  Female who presents with a chief complaint of Erythema of skin (22 Aug 2017 22:48)      Interval history:        Antimicrobials:  piperacillin/tazobactam IVPB. 3.375 Gram(s) IV Intermittent every 8 hours  vancomycin  IVPB 1000 milliGRAM(s) IV Intermittent every 8 hours    REVIEW OF SYSTEMS  General:Denies any malaise fatigue or chills. Fevers absent  Ophthalmologic:Denies any visual complaints,discharge redness or photophobia  ENMT:No nasal discharge,headache,sinus congestion or throat pain.No dental complaints  Respiratory and Thorax:No cough,sputum or chest pain.Denies shortness of breath  Cardiovascular:	No chest pain,palpitaions or dizziness  Gastrointestinal:	NO nausea,abdominal pain or diarrhea.  Genitourinary:	No dysuria,frequency. No flank pain  Musculoskeletal:No joint swelling or pain.No weakness  Neurological:No confusion,diziness.No extremity weakness.No bladder or bowel incontinence	  Psychiatric:No delusions or hallucinations	  Endocrine:No recent weight gain or loss.No abnormal heat/cold intolerance    Vital Signs Last 24 Hrs  T(C): 36.7 (08-25-17 @ 07:19), Max: 37 (08-25-17 @ 04:56)  T(F): 98.1 (08-25-17 @ 07:19), Max: 98.6 (08-25-17 @ 04:56)  HR: 58 (08-25-17 @ 07:19) (57 - 65)  BP: 111/72 (08-25-17 @ 07:19) (94/58 - 111/76)  BP(mean): --  RR: 20 (08-25-17 @ 07:19) (18 - 20)  SpO2: 98% (08-25-17 @ 07:19) (97% - 99%)    PHYSICAL EXAM:  Pleasant patient in no acute distress.  Constitutional: Comfortable Awake and alert   Eyes: PERRL EOMI.NO discharge or conjunctival injection  ENMT:No sinus tenderness.No thrush.No pharyngeal exudate or erythema.Fair dental hygiene  Neck:Supple,No LN,no JVD  Respiratory:Good air entry bilaterally,CTA  Cardiovascular:S1 S2 wnl, No murmurs,rub or gallops  Gastrointestinal:Soft BS(+) no tenderness no masses, No rebound or guarding  Genitourinary: No CVA tendereness   Extremities: No cyanosis,clubbing or edema.  Vascular: peripheral pulses felt  Neurological: AAO X 3,No grossly focal deficits  Skin:  Lymph Nodes:No palpable LNs  Musculoskeletal: No joint swelling or LOM  Psychiatric: Affect normal.      LABS                          13.0   6.02  )-----------( 304      ( 25 Aug 2017 07:08 )             39.0   08-25    140  |  99  |  11  ----------------------------<  100<H>  3.8   |  25  |  0.57    Ca    9.1      25 Aug 2017 08:54  Mg     1.9     08-24          RECENT CULTURES:  8/22: No growth to date      Radiology:  [facility] Duplex Lower Ext Vein Scan (8/25): No evidence of deep vein thrombosis in the visualized venous segments of the left lower extremity. Moderate left thigh edema. No abscess seen 61y old  Female who presents with a chief complaint of Erythema of skin (22 Aug 2017 22:48)      Interval history:  -Pt reports she still has some itchiness in the area and pain, but overall her symptoms have improved.   -On exam, marked improvement of erythema and swelling. Small amount of serosanguinous drainage from the site this AM.    Antimicrobials:  piperacillin/tazobactam IVPB. 3.375 Gram(s) IV Intermittent every 8 hours  vancomycin  IVPB 1000 milliGRAM(s) IV Intermittent every 8 hours    REVIEW OF SYSTEMS  General: Denies any malaise fatigue or chills. Fevers absent  Ophthalmologic: Denies any visual complaints,discharge redness or photophobia  ENMT:No nasal discharge,headache,sinus congestion or throat pain.No dental complaints  Respiratory and Thorax:No cough,sputum or chest pain. Denies shortness of breath  Cardiovascular:	No chest pain,palpitaions or dizziness  Gastrointestinal:	NO nausea,abdominal pain or diarrhea.  Genitourinary:	No dysuria,frequency. No flank pain  Musculoskeletal:No joint swelling or pain.No weakness  Neurological:No confusion,diziness.No extremity weakness.No bladder or bowel incontinence	  Psychiatric:No delusions or hallucinations	  Endocrine:No recent weight gain or loss.No abnormal heat/cold intolerance    Vital Signs Last 24 Hrs  T(C): 36.7 (08-25-17 @ 07:19), Max: 37 (08-25-17 @ 04:56)  T(F): 98.1 (08-25-17 @ 07:19), Max: 98.6 (08-25-17 @ 04:56)  HR: 58 (08-25-17 @ 07:19) (57 - 65)  BP: 111/72 (08-25-17 @ 07:19) (94/58 - 111/76)  BP(mean): --  RR: 20 (08-25-17 @ 07:19) (18 - 20)  SpO2: 98% (08-25-17 @ 07:19) (97% - 99%)    PHYSICAL EXAM:  Pleasant patient in no acute distress.  Constitutional: Comfortable Awake and alert   Eyes: PERRL EOMI. NO discharge or conjunctival injection  ENMT: No sinus tenderness. No thrush. No pharyngeal exudate or erythema. Fair dental hygiene  Neck: Supple, No LN, no JVD  Respiratory: Good air entry bilaterally, CTA  Cardiovascular: S1 S2 wnl, No murmurs, rub or gallops  Gastrointestinal :Soft BS(+) no tenderness no masses, No rebound or guarding  Genitourinary: No CVA tenderness   Extremities: On left upper thigh 10x5cm circular area of erythema with some warmth and tenderness, small area of induration from I&D site, 3x5cm erythematous lesion on left anterior shin with some reactive erythema around the area  Neurological: AAO X 3,No grossly focal deficits  Lymph Nodes: Left inguinal lymphadenopathy  Musculoskeletal: No joint swelling or LOM  Psychiatric: Affect normal.      LABS                          13.0   6.02  )-----------( 304      ( 25 Aug 2017 07:08 )             39.0   08-25    140  |  99  |  11  ----------------------------<  100<H>  3.8   |  25  |  0.57    Ca    9.1      25 Aug 2017 08:54  Mg     1.9     08-24          RECENT CULTURES:  8/22: No growth to date      Radiology:  [facility] Duplex Lower Ext Vein Scan (8/25): No evidence of deep vein thrombosis in the visualized venous segments of the left lower extremity. Moderate left thigh edema. No abscess seen 61y old  Female who presents with a chief complaint of Erythema of skin (22 Aug 2017 22:48)      Interval history:  -Pt reports she still has some itchiness in the area and pain, but overall her symptoms have improved.   -On exam, marked improvement of erythema and swelling. Small amount of serosanguinous drainage from the site this AM.  -Please MENDY Baires, per Dr. Recinos's recommendation     Antimicrobials:  piperacillin/tazobactam IVPB. 3.375 Gram(s) IV Intermittent every 8 hours  vancomycin  IVPB 1000 milliGRAM(s) IV Intermittent every 8 hours    REVIEW OF SYSTEMS  General: Denies any malaise fatigue or chills. Fevers absent  Ophthalmologic: Denies any visual complaints,discharge redness or photophobia  ENMT:No nasal discharge,headache,sinus congestion or throat pain.No dental complaints  Respiratory and Thorax:No cough,sputum or chest pain. Denies shortness of breath  Cardiovascular:	No chest pain,palpitaions or dizziness  Gastrointestinal:	NO nausea,abdominal pain or diarrhea.  Genitourinary:	No dysuria,frequency. No flank pain  Musculoskeletal:No joint swelling or pain.No weakness  Neurological:No confusion,diziness.No extremity weakness.No bladder or bowel incontinence	  Psychiatric:No delusions or hallucinations	  Endocrine:No recent weight gain or loss.No abnormal heat/cold intolerance    Vital Signs Last 24 Hrs  T(C): 36.7 (08-25-17 @ 07:19), Max: 37 (08-25-17 @ 04:56)  T(F): 98.1 (08-25-17 @ 07:19), Max: 98.6 (08-25-17 @ 04:56)  HR: 58 (08-25-17 @ 07:19) (57 - 65)  BP: 111/72 (08-25-17 @ 07:19) (94/58 - 111/76)  BP(mean): --  RR: 20 (08-25-17 @ 07:19) (18 - 20)  SpO2: 98% (08-25-17 @ 07:19) (97% - 99%)    PHYSICAL EXAM:  Pleasant patient in no acute distress.  Constitutional: Comfortable Awake and alert   Eyes: PERRL EOMI. NO discharge or conjunctival injection  ENMT: No sinus tenderness. No thrush. No pharyngeal exudate or erythema. Fair dental hygiene  Neck: Supple, No LN, no JVD  Respiratory: Good air entry bilaterally, CTA  Cardiovascular: S1 S2 wnl, No murmurs, rub or gallops  Gastrointestinal :Soft BS(+) no tenderness no masses, No rebound or guarding  Genitourinary: No CVA tenderness   Extremities: On left upper thigh 10x5cm circular area of erythema with some warmth and tenderness, small area of induration from I&D site, 3x5cm erythematous lesion on left anterior shin with some reactive erythema around the area  Neurological: AAO X 3,No grossly focal deficits  Lymph Nodes: Left inguinal lymphadenopathy  Musculoskeletal: No joint swelling or LOM  Psychiatric: Affect normal.      LABS                          13.0   6.02  )-----------( 304      ( 25 Aug 2017 07:08 )             39.0   08-25    140  |  99  |  11  ----------------------------<  100<H>  3.8   |  25  |  0.57    Ca    9.1      25 Aug 2017 08:54  Mg     1.9     08-24          RECENT CULTURES:  8/22: No growth to date      Radiology:  VA Duplex Lower Ext Vein Scan (8/25): No evidence of deep vein thrombosis in the visualized venous segments of the left lower extremity. Moderate left thigh edema. No abscess seen

## 2017-08-25 NOTE — DIETITIAN INITIAL EVALUATION ADULT. - NS AS NUTRI INTERV ED CONTENT
Priority modifications/Nutrition relationship to health/disease/Purpose of the nutrition education/Reviewed T2DM Nutrition Therapy Handout. Discussed balanced meal pattern, monitoring portion sizes, carbohydrate counting, including protein at each meal and snack, and limiting concentrated sweets. Reinforced importance of self-monitoring blood glucose levels./Recommended modifications

## 2017-08-25 NOTE — PROGRESS NOTE ADULT - PROBLEM SELECTOR PLAN 1
-Extent of Left thigh cellulitis worsening but erythema and tenderness improved. No s/s of obvious abscess, drainage, or deep tissue infection   --afebrile, HD stable, no leukocytosis, bcx NGTD  -c/w zosyn 3.375mg q8  (8/23-  -vanc trough 7.5, will increase to vanc 1gram q8 and recheck before 4th dose (unless otherwise rec by ID)  -c/w vanc 1250mg q12  -ID consulted recs pending  -will get US LL Extremity  to r/o abscess +/- surgery consult -Extent, erythema and tenderness of Left thigh cellulitis improving.   -US with no abscess or DVT  --afebrile, HD stable, no leukocytosis, bcx NGTD  -will d/c zosyn per ID  -vanc trough 11.5 will c/w vanc 1gram q8 and recheck before 4th dose  -ID consulted appreciated  -if cellulitis continues to improve can consider discharge over the weekend with -Bactrim DS BID x 7 additional days post IV antibiotics

## 2017-08-25 NOTE — DIETITIAN INITIAL EVALUATION ADULT. - PROBLEM SELECTOR PLAN 1
Pt presented with erythema, pain, and tenderness of left anterior thigh approx [id number], demarcated after sustaining a bug bite to the area. Pt took 2 doses of Bactrim DS with continued expansion of erythema. A/W inguinal lymphadenopathy. No systemic symptoms. Afebrile, no elevation in WBC. s/p I/D in ED with drainage of blood. Received Clindamycin.   - Will treat as purulent cellulitis, start Vancomycin 1250mg Q12. Check trough prior to 4th dose  - f/u bcx  - Tylenol prn fever

## 2017-08-25 NOTE — DIETITIAN INITIAL EVALUATION ADULT. - NS AS NUTRI INTERV MEALS SNACK
1) Continue current diet as ordered. 2) Provide food preferences within diet restrictions as feasible./Carbohydrate - modified diet

## 2017-08-25 NOTE — PROGRESS NOTE ADULT - PROBLEM SELECTOR PLAN 1
Marked improvement today of reactive cellulitis in left upper thigh.   -D/C Zosyn  -Continue Vancomycin 1g q8hr  -Monitor Vanc trough  -If sustained improvement: Resume Bactrim DS one tab BID for additional 7 days

## 2017-08-25 NOTE — DIETITIAN INITIAL EVALUATION ADULT. - ADHERENCE
No dietary restrictions PTA but generally follows a plant-based diet and eats fish, dairy and eggs. Will scarcely eat red meat and poultry. Confirms NKFA. No micronutrient supplementation PTA./n/a

## 2017-08-25 NOTE — DIETITIAN INITIAL EVALUATION ADULT. - ENERGY NEEDS
stated ht: 66 inches, wt: 192.4 pounds, BMI: 31.1 kg/m2, IBW: 130 pounds (+/- 10%), 148 %IBW  Edema: 1+ L thigh. Skin: noted scabs on lower extremities.

## 2017-08-25 NOTE — PROGRESS NOTE ADULT - PROBLEM SELECTOR PLAN 3
improved, Likely pseudohyponatremia in the setting of elevated blood glucose  - Trend BMP resolved, Likely pseudohyponatremia in the setting of elevated blood glucose

## 2017-08-25 NOTE — PROGRESS NOTE ADULT - SUBJECTIVE AND OBJECTIVE BOX
Chief Complaint: type 2 diabetes     Interval history: Pt very pleased with her recent improvement in sugars.  Sugasr 90-220s over past 24 hours.  No sx of low BG.      MEDICATIONS  (STANDING):  heparin  Injectable 5000 Unit(s) SubCutaneous every 8 hours  insulin lispro (HumaLOG) corrective regimen sliding scale   SubCutaneous at bedtime  fluticasone propionate   110 MICROgram(s) HFA Inhaler 2 Puff(s) Inhalation two times a day  vancomycin  IVPB 1000 milliGRAM(s) IV Intermittent every 8 hours  insulin lispro (HumaLOG) corrective regimen sliding scale   SubCutaneous three times a day before meals  insulin glargine Injectable (LANTUS) 17 Unit(s) SubCutaneous at bedtime  insulin lispro Injectable (HumaLOG) 6 Unit(s) SubCutaneous three times a day before meals    MEDICATIONS  (PRN):  acetaminophen   Tablet. 650 milliGRAM(s) Oral every 6 hours PRN Moderate Pain (4 - 6)  ALBUTerol    90 MICROgram(s) HFA Inhaler 2 Puff(s) Inhalation every 6 hours PRN Shortness of Breath and/or Wheezing      Allergies    No Known Allergies    Intolerances      Review of Systems:  Skin: no rash  Endocrine: no polyuria, polydipsia    ALL OTHER SYSTEMS REVIEWED AND NEGATIVE    PHYSICAL EXAM:  VITALS: T(C): 36.7 (08-25-17 @ 12:18)  T(F): 98.1 (08-25-17 @ 12:18), Max: 98.6 (08-25-17 @ 04:56)  HR: 61 (08-25-17 @ 12:18) (57 - 65)  BP: 101/68 (08-25-17 @ 12:18) (94/58 - 111/72)  RR:  (18 - 20)  SpO2:  (98% - 99%)  Wt(kg): --  GENERAL: NAD, well-developed  EYES: No proptosis, sclera anicteric  HEENT:  Atraumatic, Normocephalic, moist mucous membranes  SKIN: Dry, intact, No diffuse rashes   PSYCH: Alert and oriented x 3, normal affect, normal mood    CAPILLARY BLOOD GLUCOSE  97 (08-25 @ 12:15)  104 (08-25 @ 08:28)  107 (08-24 @ 22:11)  90 (08-24 @ 17:21)  218 (08-24 @ 12:44)  167 (08-24 @ 08:58)  210 (08-23 @ 21:24)  169 (08-23 @ 17:31)  211 (08-23 @ 08:54)  304 (08-22 @ 21:23)  183 (08-22 @ 18:04)      08-25    140  |  99  |  11  ----------------------------<  100<H>  3.8   |  25  |  0.57    EGFR if : 118  EGFR if non : 101    Ca    9.1      08-25  Mg     1.9     08-24            Thyroid Function Tests:      Hemoglobin A1C, Whole Blood: 13.0 % <H> [4.0 - 5.6] (08-22-17 @ 07:33)

## 2017-08-25 NOTE — PROGRESS NOTE ADULT - SUBJECTIVE AND OBJECTIVE BOX
Patient is a 61y old  Female who presents with a chief complaint of Erythema of skin (22 Aug 2017 22:48)      SUBJECTIVE / OVERNIGHT EVENTS: Reports some pruritis and serosanguinous oozing from site. No fever/c/n/v    MEDICATIONS  (STANDING):  heparin  Injectable 5000 Unit(s) SubCutaneous every 8 hours  insulin lispro (HumaLOG) corrective regimen sliding scale   SubCutaneous at bedtime  fluticasone propionate   110 MICROgram(s) HFA Inhaler 2 Puff(s) Inhalation two times a day  vancomycin  IVPB 1000 milliGRAM(s) IV Intermittent every 8 hours  insulin lispro (HumaLOG) corrective regimen sliding scale   SubCutaneous three times a day before meals  insulin glargine Injectable (LANTUS) 17 Unit(s) SubCutaneous at bedtime  insulin lispro Injectable (HumaLOG) 6 Unit(s) SubCutaneous three times a day before meals    MEDICATIONS  (PRN):  acetaminophen   Tablet. 650 milliGRAM(s) Oral every 6 hours PRN Moderate Pain (4 - 6)  ALBUTerol    90 MICROgram(s) HFA Inhaler 2 Puff(s) Inhalation every 6 hours PRN Shortness of Breath and/or Wheezing      Vital Signs Last 24 Hrs  T(C): 36.7 (25 Aug 2017 07:19), Max: 37 (25 Aug 2017 04:56)  T(F): 98.1 (25 Aug 2017 07:19), Max: 98.6 (25 Aug 2017 04:56)  HR: 58 (25 Aug 2017 07:19) (57 - 65)  BP: 111/72 (25 Aug 2017 07:19) (94/58 - 111/76)  BP(mean): --  RR: 20 (25 Aug 2017 07:19) (18 - 20)  SpO2: 98% (25 Aug 2017 07:19) (97% - 99%)  CAPILLARY BLOOD GLUCOSE  97 (25 Aug 2017 12:15)  104 (25 Aug 2017 08:28)  107 (24 Aug 2017 22:11)  90 (24 Aug 2017 17:21)  218 (24 Aug 2017 12:44)        I&O's Summary    24 Aug 2017 07:01  -  25 Aug 2017 07:00  --------------------------------------------------------  IN: 300 mL / OUT: 0 mL / NET: 300 mL    25 Aug 2017 07:01  -  25 Aug 2017 12:17  --------------------------------------------------------  IN: 360 mL / OUT: 0 mL / NET: 360 mL      PHYSICAL EXAM:  GENERAL: NAD, well-developed, nontoxic  HEAD:  Atraumatic, Normocephalic  EYES: EOMI, PERRLA, conjunctiva and sclera clear  NECK: Supple, No JVD  CHEST/LUNG: Clear to auscultation bilaterally; No wheeze  HEART: Regular rate and rhythm; No murmurs, rubs, or gallops  ABDOMEN: Soft, Nontender, Nondistended; Bowel sounds present  EXTREMITIES:  2+ Peripheral Pulses, No clubbing, cyanosis, or edema  PSYCH: AAOx3  NEUROLOGY: non-focal  SKIN: much improved erythema and tenderness of left groin   Lymph: palpable tender lymphadenopathy in left groin.    LABS:                        13.0   6.02  )-----------( 304      ( 25 Aug 2017 07:08 )             39.0     08-25    140  |  99  |  11  ----------------------------<  100<H>  3.8   |  25  |  0.57    Ca    9.1      25 Aug 2017 08:54  Mg     1.9     08-24      Vanc trough 11.5  Bcx NGTD        RADIOLOGY & ADDITIONAL TESTS:    LE U/s: IMPRESSION: No evidence of deep vein thrombosis in the visualized venous   segments of the left lower extremity.    Moderate left thigh edema. No abscess seen.    Consultant(s) Notes Reviewed:  ID, Endocrine Patient is a 61y old  Female who presents with a chief complaint of Erythema of skin (22 Aug 2017 22:48)      SUBJECTIVE / OVERNIGHT EVENTS: Reports some pruritis and serosanguinous oozing from site. Overall feels like redness and extent of infection got better. No fever/c/n/v    MEDICATIONS  (STANDING):  heparin  Injectable 5000 Unit(s) SubCutaneous every 8 hours  insulin lispro (HumaLOG) corrective regimen sliding scale   SubCutaneous at bedtime  fluticasone propionate   110 MICROgram(s) HFA Inhaler 2 Puff(s) Inhalation two times a day  vancomycin  IVPB 1000 milliGRAM(s) IV Intermittent every 8 hours  insulin lispro (HumaLOG) corrective regimen sliding scale   SubCutaneous three times a day before meals  insulin glargine Injectable (LANTUS) 17 Unit(s) SubCutaneous at bedtime  insulin lispro Injectable (HumaLOG) 6 Unit(s) SubCutaneous three times a day before meals    MEDICATIONS  (PRN):  acetaminophen   Tablet. 650 milliGRAM(s) Oral every 6 hours PRN Moderate Pain (4 - 6)  ALBUTerol    90 MICROgram(s) HFA Inhaler 2 Puff(s) Inhalation every 6 hours PRN Shortness of Breath and/or Wheezing      Vital Signs Last 24 Hrs  T(C): 36.7 (25 Aug 2017 07:19), Max: 37 (25 Aug 2017 04:56)  T(F): 98.1 (25 Aug 2017 07:19), Max: 98.6 (25 Aug 2017 04:56)  HR: 58 (25 Aug 2017 07:19) (57 - 65)  BP: 111/72 (25 Aug 2017 07:19) (94/58 - 111/76)  BP(mean): --  RR: 20 (25 Aug 2017 07:19) (18 - 20)  SpO2: 98% (25 Aug 2017 07:19) (97% - 99%)  CAPILLARY BLOOD GLUCOSE  97 (25 Aug 2017 12:15)  104 (25 Aug 2017 08:28)  107 (24 Aug 2017 22:11)  90 (24 Aug 2017 17:21)  218 (24 Aug 2017 12:44)        I&O's Summary    24 Aug 2017 07:01  -  25 Aug 2017 07:00  --------------------------------------------------------  IN: 300 mL / OUT: 0 mL / NET: 300 mL    25 Aug 2017 07:01  -  25 Aug 2017 12:17  --------------------------------------------------------  IN: 360 mL / OUT: 0 mL / NET: 360 mL      PHYSICAL EXAM:  GENERAL: NAD, well-developed, nontoxic  HEAD:  Atraumatic, Normocephalic  EYES: EOMI, PERRLA, conjunctiva and sclera clear  NECK: Supple, No JVD  CHEST/LUNG: Clear to auscultation bilaterally; No wheeze  HEART: Regular rate and rhythm; No murmurs, rubs, or gallops  ABDOMEN: Soft, Nontender, Nondistended; Bowel sounds present  EXTREMITIES:  2+ Peripheral Pulses, No clubbing, cyanosis, or edema  PSYCH: AAOx3  NEUROLOGY: non-focal  SKIN: much improved erythema and tenderness of left thigh, still some area of induration near area of attempted I and D but no abscess, drainage, or pus.   Lymph: palpable tender lymphadenopathy in left groin.    LABS:                        13.0   6.02  )-----------( 304      ( 25 Aug 2017 07:08 )             39.0     08-25    140  |  99  |  11  ----------------------------<  100<H>  3.8   |  25  |  0.57    Ca    9.1      25 Aug 2017 08:54  Mg     1.9     08-24      Vanc trough 11.5  Bcx NGTD        RADIOLOGY & ADDITIONAL TESTS:    LE U/s: IMPRESSION: No evidence of deep vein thrombosis in the visualized venous   segments of the left lower extremity.    Moderate left thigh edema. No abscess seen.    Consultant(s) Notes Reviewed:  ID, Endocrine

## 2017-08-25 NOTE — DIETITIAN INITIAL EVALUATION ADULT. - OTHER INFO
Nutrition consult received and appreciated. Pt reports being diagnosed with prediabetes 20 years ago and was placed on metformin. She lost 60lbs and was experiencing hypoglycemia so metformin was discontinued. Pt states she has been stable at 185-195lbs for the past 20 years, currently weighs 192.4lbs. Pt presented c cellulitis and found to have an HbA1c of 13%. Pt is being followed by endocrine and RN has provided education on insulin administration and checking blood sugars. Pt eager to learn about a healthy eating diet, verbal and written education was provided. Pt reports a good appetite since admission. Denies any GI distress or chewing/swallowing difficulties.

## 2017-08-25 NOTE — DIETITIAN INITIAL EVALUATION ADULT. - PROBLEM SELECTOR PLAN 3
Blood glucose elevated to 345 on admission lab work. Pt reported h/o pre diabetes in the past, on Metformin but was taken off 2/2 improved glucose control in the setting of 60 lb weight loss. Received 1L NS repeat  post fluid administration.   - A1c pending, check UA  - Will start pt on weight based insulin dosing. At 0.4u/kg, TDD would be 34 units. Will start Lantus 15units QHS and Humalog 5 units premeal.   - Monitor FS, adjust insulin dosing accordingly.

## 2017-08-25 NOTE — PROGRESS NOTE ADULT - PROBLEM SELECTOR PLAN 1
Pt currently on 18G qhs and 8H with sugars at low 100s down to 90 last night.  As she did eat josh crackers before bed and still wok up at 104, I would like to slightly reduce her insulin. She would like to keep these lower numbers however.  Will do the following as compromise: will reduce to 17G tonight, and advised her that if she has any sx of low BG, must alert nurse to check (she has hypoglycemic awareness). Pt currently on 18G qhs and 8H with sugars at low 100s down to 90 last night.  As she did eat josh crackers before bed and still wok up at 104, I would like to slightly reduce her insulin. She would like to keep these lower numbers however.  Will do the following as compromise: will reduce to 17G tonight and reduce humalog from 8 to 7 tidac, and advised her that if she has any sx of low BG, must alert nurse to check (she has hypoglycemic awareness).

## 2017-08-26 LAB
ANION GAP SERPL CALC-SCNC: 14 MMOL/L — SIGNIFICANT CHANGE UP (ref 5–17)
BASOPHILS # BLD AUTO: 0.05 K/UL — SIGNIFICANT CHANGE UP (ref 0–0.2)
BASOPHILS NFR BLD AUTO: 0.8 % — SIGNIFICANT CHANGE UP (ref 0–2)
BUN SERPL-MCNC: 11 MG/DL — SIGNIFICANT CHANGE UP (ref 7–23)
CALCIUM SERPL-MCNC: 10 MG/DL — SIGNIFICANT CHANGE UP (ref 8.4–10.5)
CHLORIDE SERPL-SCNC: 103 MMOL/L — SIGNIFICANT CHANGE UP (ref 96–108)
CO2 SERPL-SCNC: 25 MMOL/L — SIGNIFICANT CHANGE UP (ref 22–31)
CREAT SERPL-MCNC: 0.82 MG/DL — SIGNIFICANT CHANGE UP (ref 0.5–1.3)
EOSINOPHIL # BLD AUTO: 0.37 K/UL — SIGNIFICANT CHANGE UP (ref 0–0.5)
EOSINOPHIL NFR BLD AUTO: 5.7 % — SIGNIFICANT CHANGE UP (ref 0–6)
GLUCOSE SERPL-MCNC: 126 MG/DL — HIGH (ref 70–99)
HCT VFR BLD CALC: 42.2 % — SIGNIFICANT CHANGE UP (ref 34.5–45)
HGB BLD-MCNC: 14.3 G/DL — SIGNIFICANT CHANGE UP (ref 11.5–15.5)
IMM GRANULOCYTES NFR BLD AUTO: 0.3 % — SIGNIFICANT CHANGE UP (ref 0–1.5)
LYMPHOCYTES # BLD AUTO: 2.88 K/UL — SIGNIFICANT CHANGE UP (ref 1–3.3)
LYMPHOCYTES # BLD AUTO: 44.7 % — HIGH (ref 13–44)
MCHC RBC-ENTMCNC: 29.4 PG — SIGNIFICANT CHANGE UP (ref 27–34)
MCHC RBC-ENTMCNC: 33.9 GM/DL — SIGNIFICANT CHANGE UP (ref 32–36)
MCV RBC AUTO: 86.7 FL — SIGNIFICANT CHANGE UP (ref 80–100)
MONOCYTES # BLD AUTO: 0.45 K/UL — SIGNIFICANT CHANGE UP (ref 0–0.9)
MONOCYTES NFR BLD AUTO: 7 % — SIGNIFICANT CHANGE UP (ref 2–14)
NEUTROPHILS # BLD AUTO: 2.68 K/UL — SIGNIFICANT CHANGE UP (ref 1.8–7.4)
NEUTROPHILS NFR BLD AUTO: 41.5 % — LOW (ref 43–77)
PLATELET # BLD AUTO: 320 K/UL — SIGNIFICANT CHANGE UP (ref 150–400)
POTASSIUM SERPL-MCNC: 4.3 MMOL/L — SIGNIFICANT CHANGE UP (ref 3.5–5.3)
POTASSIUM SERPL-SCNC: 4.3 MMOL/L — SIGNIFICANT CHANGE UP (ref 3.5–5.3)
RBC # BLD: 4.87 M/UL — SIGNIFICANT CHANGE UP (ref 3.8–5.2)
RBC # FLD: 12.3 % — SIGNIFICANT CHANGE UP (ref 10.3–14.5)
SODIUM SERPL-SCNC: 142 MMOL/L — SIGNIFICANT CHANGE UP (ref 135–145)
VANCOMYCIN TROUGH SERPL-MCNC: 10.7 UG/ML — SIGNIFICANT CHANGE UP (ref 10–20)
WBC # BLD: 6.45 K/UL — SIGNIFICANT CHANGE UP (ref 3.8–10.5)
WBC # FLD AUTO: 6.45 K/UL — SIGNIFICANT CHANGE UP (ref 3.8–10.5)

## 2017-08-26 PROCEDURE — 99233 SBSQ HOSP IP/OBS HIGH 50: CPT

## 2017-08-26 PROCEDURE — 99232 SBSQ HOSP IP/OBS MODERATE 35: CPT

## 2017-08-26 RX ADMIN — Medication 2 PUFF(S): at 17:31

## 2017-08-26 RX ADMIN — Medication 2 PUFF(S): at 05:27

## 2017-08-26 RX ADMIN — Medication 7 UNIT(S): at 12:54

## 2017-08-26 RX ADMIN — INSULIN GLARGINE 17 UNIT(S): 100 INJECTION, SOLUTION SUBCUTANEOUS at 21:55

## 2017-08-26 RX ADMIN — Medication 250 MILLIGRAM(S): at 06:28

## 2017-08-26 RX ADMIN — Medication 7 UNIT(S): at 18:17

## 2017-08-26 RX ADMIN — Medication 250 MILLIGRAM(S): at 21:55

## 2017-08-26 RX ADMIN — Medication 250 MILLIGRAM(S): at 13:47

## 2017-08-26 RX ADMIN — HEPARIN SODIUM 5000 UNIT(S): 5000 INJECTION INTRAVENOUS; SUBCUTANEOUS at 05:27

## 2017-08-26 RX ADMIN — Medication 7 UNIT(S): at 09:02

## 2017-08-26 NOTE — PROGRESS NOTE ADULT - SUBJECTIVE AND OBJECTIVE BOX
Follow Up:      Interval History/ROS: ambulating, non toxic     Allergies  No Known Allergies    ANTIMICROBIALS:  vancomycin  IVPB 1000 every 8 hours    OTHER MEDS:  MEDICATIONS  (STANDING):  heparin  Injectable 5000 every 8 hours  acetaminophen   Tablet. 650 every 6 hours PRN  insulin lispro (HumaLOG) corrective regimen sliding scale  at bedtime  fluticasone propionate   110 MICROgram(s) HFA Inhaler 2 two times a day  ALBUTerol    90 MICROgram(s) HFA Inhaler 2 every 6 hours PRN  insulin lispro (HumaLOG) corrective regimen sliding scale  three times a day before meals  insulin glargine Injectable (LANTUS) 17 at bedtime  insulin lispro Injectable (HumaLOG) 7 three times a day before meals    Vital Signs Last 24 Hrs  T(C): 36.7 (26 Aug 2017 00:41), Max: 36.8 (25 Aug 2017 16:15)  T(F): 98.1 (26 Aug 2017 00:41), Max: 98.2 (25 Aug 2017 16:15)  HR: 56 (26 Aug 2017 00:41) (56 - 61)  BP: 103/54 (26 Aug 2017 00:41) (99/63 - 103/54)  BP(mean): --  RR: 18 (26 Aug 2017 00:41) (18 - 20)  SpO2: 99% (26 Aug 2017 00:41) (98% - 100%)    PHYSICAL EXAM:  General: WN/WD NAD, Non-toxic  Neurology: A&Ox3, nonfocal  Extremities: No edema, resolved reactive cellulitis - focal indurated phlegmon right anterior thigh with darkening superficial erythema consistent with resolving infection  Line Sites: Clear left peripheral IV  Skin: No rash                        14.3   6.45  )-----------( 320      ( 26 Aug 2017 08:33 )             42.2       08-26    142  |  103  |  11  ----------------------------<  126<H>  4.3   |  25  |  0.82    Ca    10.0      26 Aug 2017 08:37      MICROBIOLOGY:  Culture - Blood (08.22.17 @ 02:53)    Specimen Source: .Blood Blood-Peripheral    Culture Results:   No growth to date.    Culture - Blood (08.22.17 @ 02:53)    Specimen Source: .Blood Blood-Peripheral    Culture Results:   No growth to date.      Vancomycin Level, Trough: 10.7 ug/mL (08-26 @ 05:27)      RADIOLOGY:    Miquel Recinos MD; Division of Infectious Disease; Pager: [phone]; nights and weekends: [phone]

## 2017-08-26 NOTE — PROGRESS NOTE ADULT - PROBLEM SELECTOR PLAN 1
-Extent, erythema and tenderness of Left thigh cellulitis improving.   -US with no abscess or DVT  --afebrile, HD stable, no leukocytosis, bcx NGTD  -vanc trough 10 will c/w vanc 1gram q8  -ID consulted appreciated  -if cellulitis continues to improve can consider discharge over the weekend with -Bactrim DS BID x 7 additional days post IV antibiotics

## 2017-08-26 NOTE — PROGRESS NOTE ADULT - PROBLEM SELECTOR PLAN 2
-c/w lantus 17qhs  -c/w aspart 7 units tid with meals  -c/w pilar  -monitor FS  -nursing is doing insulin teaching, patient says she feels comfortable with it  -discharge insulin regimen per endocrine

## 2017-08-26 NOTE — PROGRESS NOTE ADULT - SUBJECTIVE AND OBJECTIVE BOX
Patient is a 61y old  Female who presents with a chief complaint of Erythema of skin (22 Aug 2017 22:48)      SUBJECTIVE / OVERNIGHT EVENTS:    patient seen and examined. pt. feels a lot better. but hesitant to leave, saying it was big pruitic yesterday, but otherwise is much much improved.     MEDICATIONS  (STANDING):  heparin  Injectable 5000 Unit(s) SubCutaneous every 8 hours  insulin lispro (HumaLOG) corrective regimen sliding scale   SubCutaneous at bedtime  fluticasone propionate   110 MICROgram(s) HFA Inhaler 2 Puff(s) Inhalation two times a day  vancomycin  IVPB 1000 milliGRAM(s) IV Intermittent every 8 hours  insulin lispro (HumaLOG) corrective regimen sliding scale   SubCutaneous three times a day before meals  insulin glargine Injectable (LANTUS) 17 Unit(s) SubCutaneous at bedtime  insulin lispro Injectable (HumaLOG) 7 Unit(s) SubCutaneous three times a day before meals    MEDICATIONS  (PRN):  acetaminophen   Tablet. 650 milliGRAM(s) Oral every 6 hours PRN Moderate Pain (4 - 6)  ALBUTerol    90 MICROgram(s) HFA Inhaler 2 Puff(s) Inhalation every 6 hours PRN Shortness of Breath and/or Wheezing        CAPILLARY BLOOD GLUCOSE  139 (26 Aug 2017 08:44)  175 (25 Aug 2017 21:21)  148 (25 Aug 2017 18:04)  148 (25 Aug 2017 17:57)  97 (25 Aug 2017 12:15)        I&O's Summary    25 Aug 2017 07:01  -  26 Aug 2017 07:00  --------------------------------------------------------  IN: 2320 mL / OUT: 0 mL / NET: 2320 mL        PHYSICAL EXAM:  GENERAL: NAD, well-developed, nontoxic  HEAD:  Atraumatic, Normocephalic  EYES: EOMI, PERRLA, conjunctiva and sclera clear  NECK: Supple, No JVD  CHEST/LUNG: Clear to auscultation bilaterally; No wheeze  HEART: Regular rate and rhythm; No murmurs, rubs, or gallops  ABDOMEN: Soft, Nontender, Nondistended; Bowel sounds present  EXTREMITIES:  2+ Peripheral Pulses, No clubbing, cyanosis, or edema  PSYCH: AAOx3  NEUROLOGY: non-focal  SKIN: much improved erythema and tenderness of left thigh, still some area of induration near area of attempted I and D but no abscess, drainage, or pus.       LABS:                        14.3   6.45  )-----------( 320      ( 26 Aug 2017 08:33 )             42.2     08-26    142  |  103  |  11  ----------------------------<  126<H>  4.3   |  25  |  0.82    Ca    10.0      26 Aug 2017 08:37                RADIOLOGY & ADDITIONAL TESTS:        Care Discussed with Consultants/Other Providers:  NP

## 2017-08-27 VITALS
DIASTOLIC BLOOD PRESSURE: 68 MMHG | OXYGEN SATURATION: 96 % | HEART RATE: 62 BPM | SYSTOLIC BLOOD PRESSURE: 113 MMHG | TEMPERATURE: 98 F | RESPIRATION RATE: 18 BRPM

## 2017-08-27 LAB
ANION GAP SERPL CALC-SCNC: 14 MMOL/L — SIGNIFICANT CHANGE UP (ref 5–17)
BASOPHILS # BLD AUTO: 0.06 K/UL — SIGNIFICANT CHANGE UP (ref 0–0.2)
BASOPHILS NFR BLD AUTO: 1 % — SIGNIFICANT CHANGE UP (ref 0–2)
BUN SERPL-MCNC: 12 MG/DL — SIGNIFICANT CHANGE UP (ref 7–23)
CALCIUM SERPL-MCNC: 9.4 MG/DL — SIGNIFICANT CHANGE UP (ref 8.4–10.5)
CHLORIDE SERPL-SCNC: 103 MMOL/L — SIGNIFICANT CHANGE UP (ref 96–108)
CO2 SERPL-SCNC: 25 MMOL/L — SIGNIFICANT CHANGE UP (ref 22–31)
CREAT SERPL-MCNC: 0.68 MG/DL — SIGNIFICANT CHANGE UP (ref 0.5–1.3)
CULTURE RESULTS: SIGNIFICANT CHANGE UP
CULTURE RESULTS: SIGNIFICANT CHANGE UP
EOSINOPHIL # BLD AUTO: 0.36 K/UL — SIGNIFICANT CHANGE UP (ref 0–0.5)
EOSINOPHIL NFR BLD AUTO: 6 % — SIGNIFICANT CHANGE UP (ref 0–6)
GLUCOSE SERPL-MCNC: 110 MG/DL — HIGH (ref 70–99)
HCT VFR BLD CALC: 40.7 % — SIGNIFICANT CHANGE UP (ref 34.5–45)
HGB BLD-MCNC: 13.5 G/DL — SIGNIFICANT CHANGE UP (ref 11.5–15.5)
IMM GRANULOCYTES NFR BLD AUTO: 0.2 % — SIGNIFICANT CHANGE UP (ref 0–1.5)
LYMPHOCYTES # BLD AUTO: 2.33 K/UL — SIGNIFICANT CHANGE UP (ref 1–3.3)
LYMPHOCYTES # BLD AUTO: 38.8 % — SIGNIFICANT CHANGE UP (ref 13–44)
MCHC RBC-ENTMCNC: 28.2 PG — SIGNIFICANT CHANGE UP (ref 27–34)
MCHC RBC-ENTMCNC: 33.2 GM/DL — SIGNIFICANT CHANGE UP (ref 32–36)
MCV RBC AUTO: 85.1 FL — SIGNIFICANT CHANGE UP (ref 80–100)
MONOCYTES # BLD AUTO: 0.54 K/UL — SIGNIFICANT CHANGE UP (ref 0–0.9)
MONOCYTES NFR BLD AUTO: 9 % — SIGNIFICANT CHANGE UP (ref 2–14)
NEUTROPHILS # BLD AUTO: 2.7 K/UL — SIGNIFICANT CHANGE UP (ref 1.8–7.4)
NEUTROPHILS NFR BLD AUTO: 45 % — SIGNIFICANT CHANGE UP (ref 43–77)
PLATELET # BLD AUTO: 303 K/UL — SIGNIFICANT CHANGE UP (ref 150–400)
POTASSIUM SERPL-MCNC: 4.1 MMOL/L — SIGNIFICANT CHANGE UP (ref 3.5–5.3)
POTASSIUM SERPL-SCNC: 4.1 MMOL/L — SIGNIFICANT CHANGE UP (ref 3.5–5.3)
RBC # BLD: 4.78 M/UL — SIGNIFICANT CHANGE UP (ref 3.8–5.2)
RBC # FLD: 12.2 % — SIGNIFICANT CHANGE UP (ref 10.3–14.5)
SODIUM SERPL-SCNC: 142 MMOL/L — SIGNIFICANT CHANGE UP (ref 135–145)
SPECIMEN SOURCE: SIGNIFICANT CHANGE UP
SPECIMEN SOURCE: SIGNIFICANT CHANGE UP
WBC # BLD: 6 K/UL — SIGNIFICANT CHANGE UP (ref 3.8–10.5)
WBC # FLD AUTO: 6 K/UL — SIGNIFICANT CHANGE UP (ref 3.8–10.5)

## 2017-08-27 PROCEDURE — 93971 EXTREMITY STUDY: CPT

## 2017-08-27 PROCEDURE — 94640 AIRWAY INHALATION TREATMENT: CPT

## 2017-08-27 PROCEDURE — 96374 THER/PROPH/DIAG INJ IV PUSH: CPT | Mod: XU

## 2017-08-27 PROCEDURE — 10060 I&D ABSCESS SIMPLE/SINGLE: CPT

## 2017-08-27 PROCEDURE — 99239 HOSP IP/OBS DSCHRG MGMT >30: CPT

## 2017-08-27 PROCEDURE — 76881 US COMPL JOINT R-T W/IMG: CPT

## 2017-08-27 PROCEDURE — 85027 COMPLETE CBC AUTOMATED: CPT

## 2017-08-27 PROCEDURE — 83036 HEMOGLOBIN GLYCOSYLATED A1C: CPT

## 2017-08-27 PROCEDURE — 99285 EMERGENCY DEPT VISIT HI MDM: CPT | Mod: 25

## 2017-08-27 PROCEDURE — 87040 BLOOD CULTURE FOR BACTERIA: CPT

## 2017-08-27 PROCEDURE — 81003 URINALYSIS AUTO W/O SCOPE: CPT

## 2017-08-27 PROCEDURE — 80048 BASIC METABOLIC PNL TOTAL CA: CPT

## 2017-08-27 PROCEDURE — 80053 COMPREHEN METABOLIC PANEL: CPT

## 2017-08-27 PROCEDURE — 83735 ASSAY OF MAGNESIUM: CPT

## 2017-08-27 PROCEDURE — 80202 ASSAY OF VANCOMYCIN: CPT

## 2017-08-27 RX ORDER — INSULIN NPH HUM/REG INSULIN HM 70-30/ML
1 VIAL (ML) SUBCUTANEOUS
Qty: 1 | Refills: 0 | OUTPATIENT
Start: 2017-08-27

## 2017-08-27 RX ORDER — FLUTICASONE PROPIONATE 220 MCG
2 AEROSOL WITH ADAPTER (GRAM) INHALATION
Qty: 0 | Refills: 0 | COMMUNITY

## 2017-08-27 RX ORDER — NYSTATIN 500MM UNIT
0 POWDER (EA) MISCELLANEOUS
Qty: 0 | Refills: 0 | COMMUNITY

## 2017-08-27 RX ORDER — ISOPROPYL ALCOHOL, BENZOCAINE .7; .06 ML/ML; ML/ML
0 SWAB TOPICAL
Qty: 2 | Refills: 0 | OUTPATIENT
Start: 2017-08-27

## 2017-08-27 RX ORDER — AZTREONAM 2 G
1 VIAL (EA) INJECTION
Qty: 14 | Refills: 0 | OUTPATIENT
Start: 2017-08-27

## 2017-08-27 RX ORDER — ALBUTEROL 90 UG/1
2 AEROSOL, METERED ORAL
Qty: 0 | Refills: 0 | COMMUNITY

## 2017-08-27 RX ADMIN — Medication 250 MILLIGRAM(S): at 05:40

## 2017-08-27 RX ADMIN — Medication 7 UNIT(S): at 12:52

## 2017-08-27 RX ADMIN — Medication 2 PUFF(S): at 05:41

## 2017-08-27 RX ADMIN — Medication 1: at 12:52

## 2017-08-27 RX ADMIN — Medication 7 UNIT(S): at 09:13

## 2017-08-27 NOTE — PROGRESS NOTE ADULT - SUBJECTIVE AND OBJECTIVE BOX
Patient is a 61y old  Female who presents with a chief complaint of Erythema of skin (22 Aug 2017 22:48)      SUBJECTIVE / OVERNIGHT EVENTS:    patient seen and examined, no fevers, chills, dizziness, or pain, feels good wants to go home.    MEDICATIONS  (STANDING):  heparin  Injectable 5000 Unit(s) SubCutaneous every 8 hours  insulin lispro (HumaLOG) corrective regimen sliding scale   SubCutaneous at bedtime  fluticasone propionate   110 MICROgram(s) HFA Inhaler 2 Puff(s) Inhalation two times a day  vancomycin  IVPB 1000 milliGRAM(s) IV Intermittent every 8 hours  insulin lispro (HumaLOG) corrective regimen sliding scale   SubCutaneous three times a day before meals  insulin glargine Injectable (LANTUS) 17 Unit(s) SubCutaneous at bedtime  insulin lispro Injectable (HumaLOG) 7 Unit(s) SubCutaneous three times a day before meals    MEDICATIONS  (PRN):  acetaminophen   Tablet. 650 milliGRAM(s) Oral every 6 hours PRN Moderate Pain (4 - 6)  ALBUTerol    90 MICROgram(s) HFA Inhaler 2 Puff(s) Inhalation every 6 hours PRN Shortness of Breath and/or Wheezing        CAPILLARY BLOOD GLUCOSE  124 (27 Aug 2017 09:12)  160 (26 Aug 2017 20:57)  120 (26 Aug 2017 18:15)  100 (26 Aug 2017 12:50)        I&O's Summary    26 Aug 2017 07:01  -  27 Aug 2017 07:00  --------------------------------------------------------  IN: 660 mL / OUT: 0 mL / NET: 660 mL      PHYSICAL EXAM:  GENERAL: NAD, well-developed, nontoxic  HEAD:  Atraumatic, Normocephalic  EYES: EOMI, PERRLA, conjunctiva and sclera clear  NECK: Supple, No JVD  CHEST/LUNG: Clear to auscultation bilaterally; No wheeze  HEART: Regular rate and rhythm; No murmurs, rubs, or gallops  ABDOMEN: Soft, Nontender, Nondistended; Bowel sounds present  EXTREMITIES:  2+ Peripheral Pulses, No clubbing, cyanosis, or edema  PSYCH: AAOx3  NEUROLOGY: non-focal  SKIN: much improved erythema and tenderness of left thigh, still some area of induration near area of attempted I and D but no abscess, drainage, or pus.     LABS:                        13.5   6.00  )-----------( 303      ( 27 Aug 2017 08:34 )             40.7     08-27    142  |  103  |  12  ----------------------------<  110<H>  4.1   |  25  |  0.68    Ca    9.4      27 Aug 2017 08:19              Consultant(s) Notes Reviewed:    ID  Care Discussed with Consultants/Other Providers:  NP

## 2017-08-27 NOTE — PROGRESS NOTE ADULT - ASSESSMENT
60 y/o female with h/o allergy induced asthma who presented with left thigh erythema for 4 days c/w cellulitis +/- abscess of LLE with attempted I and D with in ER with minimal drainage also with newly diagnosed insulin dependent diabetes.
62 y/o F w/ uncontrolled Type 2 DM A1c 13% admitted with cellulitis
62 y/o female with h/o allergy induced asthma who presented with left thigh erythema for 4 days c/w cellulitis +/- abscess of LLE with attempted I and D with in ER with minimal drainage also with newly diagnosed insulin dependent diabetes. Cellulitis worsening on vancomycin
Improved.  Continued gylcemic control encouraged    Suggest: apply hot packs to left prox thigh   if stable, discharge 8/27 on Bactrim DS 1 po BID through 9/2
60 y/o female with h/o allergy induced asthma who presented with left thigh erythema for 4 days c/w cellulitis +/- abscess of LLE with attempted I and D with in ER with minimal drainage also with newly diagnosed insulin dependent diabetes.
60 y/o female with h/o allergy induced asthma who presented with left thigh erythema for 4 days c/w Staph (MRSA vs MSSA) purulent cellulitis +/- abscess of LLE with attempted I and D in ER with minimal drainage of mostly blood, most likely in the setting of newly diagnosed diabetes. Pt is on day 4 of Vancomycin treatment (trough=11.5) w/ gradual improvement of erythema, pain and swelling. Evaluation remarkable for obesity and poorly controlled new diabetes with Hgb A1C = 13.0.

## 2017-08-27 NOTE — PROGRESS NOTE ADULT - PROBLEM SELECTOR PLAN 4
- HSQ for DVT ppx    Diabetic diet

## 2017-08-27 NOTE — PROGRESS NOTE ADULT - PROBLEM SELECTOR PROBLEM 1
Cellulitis of left leg
Uncontrolled type 2 diabetes mellitus with hyperglycemia, unspecified long term insulin use status
Cellulitis of left leg
Cellulitis of left leg

## 2017-08-27 NOTE — PROGRESS NOTE ADULT - ATTENDING COMMENTS
-Cellulitis improving, would c/w IV vanc 1-2 more days and if continued improvement can discharge over the weekend with Bactrim DS BID x 7 more additional days post IV antibiotics  -Newly Diagnosed type 2 DM, insulin on discharge to be determined by endocrine  -possible discharge this weekend
d/c home on bactrim 1 tab po bid through 9/2 as per id
-Cellulitis improving, would c/w IV vanc 1-2 more days and if continued improvement can discharge over the weekend with Bactrim DS BID x 7 more additional days post IV antibiotics  -Newly Diagnosed type 2 DM, insulin on discharge to be determined by endocrine  -possible discharge this weekend
non toxic  improved glycemic control  local bleeding from right thigh phlegmon - which is decreased in size - improved local erythema    suggest continue IV Vanco  likely be able to discharge with po Bactrim 8/26 through 9/2/17

## 2017-08-27 NOTE — PROGRESS NOTE ADULT - PROBLEM SELECTOR PROBLEM 3
Hyponatremia
Uncontrolled type 2 diabetes mellitus with hyperglycemia, unspecified long term insulin use status
Hyponatremia

## 2017-08-27 NOTE — PROGRESS NOTE ADULT - PROBLEM SELECTOR PROBLEM 2
Cellulitis and abscess
Hyponatremia
Uncontrolled type 2 diabetes mellitus with hyperglycemia, unspecified long term insulin use status

## 2017-08-30 PROBLEM — Z00.00 ENCOUNTER FOR PREVENTIVE HEALTH EXAMINATION: Status: ACTIVE | Noted: 2017-08-30

## 2017-08-31 ENCOUNTER — APPOINTMENT (OUTPATIENT)
Dept: ENDOCRINOLOGY | Facility: CLINIC | Age: 62
End: 2017-08-31
Payer: SELF-PAY

## 2017-08-31 VITALS
HEART RATE: 74 BPM | HEIGHT: 66 IN | DIASTOLIC BLOOD PRESSURE: 76 MMHG | OXYGEN SATURATION: 98 % | SYSTOLIC BLOOD PRESSURE: 130 MMHG | WEIGHT: 192 LBS | BODY MASS INDEX: 30.86 KG/M2

## 2017-08-31 DIAGNOSIS — Z83.3 FAMILY HISTORY OF DIABETES MELLITUS: ICD-10-CM

## 2017-08-31 DIAGNOSIS — Z83.79 FAMILY HISTORY OF OTHER DISEASES OF THE DIGESTIVE SYSTEM: ICD-10-CM

## 2017-08-31 DIAGNOSIS — J45.909 UNSPECIFIED ASTHMA, UNCOMPLICATED: ICD-10-CM

## 2017-08-31 PROCEDURE — 99204 OFFICE O/P NEW MOD 45 MIN: CPT

## 2017-08-31 RX ORDER — FLUTICASONE PROPIONATE 220 MCG
AEROSOL WITH ADAPTER (GRAM) INHALATION
Refills: 0 | Status: ACTIVE | COMMUNITY

## 2017-08-31 RX ORDER — FLUTICASONE PROPIONATE 50 MCG
SPRAY, SUSPENSION NASAL
Refills: 0 | Status: ACTIVE | COMMUNITY

## 2017-08-31 RX ORDER — ALBUTEROL 90 MCG
AEROSOL (GRAM) INHALATION
Refills: 0 | Status: ACTIVE | COMMUNITY

## 2017-08-31 RX ORDER — ISOPROPYL ALCOHOL 0.75 G/1
SWAB TOPICAL
Qty: 200 | Refills: 0 | Status: ACTIVE | COMMUNITY
Start: 2017-08-27

## 2017-08-31 RX ORDER — BLOOD-GLUCOSE METER
KIT MISCELLANEOUS
Qty: 1 | Refills: 0 | Status: ACTIVE | COMMUNITY
Start: 2017-08-27

## 2017-08-31 RX ORDER — LORATADINE 10 MG/1
CAPSULE, LIQUID FILLED ORAL
Refills: 0 | Status: ACTIVE | COMMUNITY

## 2017-09-01 RX ORDER — PEN NEEDLE, DIABETIC 29 G X1/2"
32G X 4 MM NEEDLE, DISPOSABLE MISCELLANEOUS
Qty: 1 | Refills: 1 | Status: ACTIVE | COMMUNITY
Start: 2017-09-01 | End: 1900-01-01

## 2017-09-05 LAB
CHOLEST SERPL-MCNC: 242 MG/DL
CHOLEST/HDLC SERPL: 3.8 RATIO
HDLC SERPL-MCNC: 63 MG/DL
LDLC SERPL CALC-MCNC: 159 MG/DL
TRIGL SERPL-MCNC: 101 MG/DL

## 2017-09-26 ENCOUNTER — CLINICAL ADVICE (OUTPATIENT)
Age: 62
End: 2017-09-26

## 2017-09-26 RX ORDER — HUMAN INSULIN 100 [IU]/ML
(70-30) 100 INJECTION, SUSPENSION SUBCUTANEOUS
Qty: 10 | Refills: 0 | Status: DISCONTINUED | COMMUNITY
Start: 2017-08-27 | End: 2017-09-26

## 2017-10-25 ENCOUNTER — RX RENEWAL (OUTPATIENT)
Age: 62
End: 2017-10-25

## 2017-11-08 ENCOUNTER — APPOINTMENT (OUTPATIENT)
Dept: ENDOCRINOLOGY | Facility: CLINIC | Age: 62
End: 2017-11-08
Payer: MEDICAID

## 2017-11-08 ENCOUNTER — RESULT CHARGE (OUTPATIENT)
Age: 62
End: 2017-11-08

## 2017-11-08 VITALS — BODY MASS INDEX: 31.89 KG/M2 | WEIGHT: 197.6 LBS

## 2017-11-08 LAB — GLUCOSE BLDC GLUCOMTR-MCNC: 142

## 2017-11-08 PROCEDURE — 82962 GLUCOSE BLOOD TEST: CPT

## 2017-11-08 PROCEDURE — G0108 DIAB MANAGE TRN  PER INDIV: CPT

## 2017-11-13 ENCOUNTER — CLINICAL ADVICE (OUTPATIENT)
Age: 62
End: 2017-11-13

## 2017-11-30 ENCOUNTER — RX RENEWAL (OUTPATIENT)
Age: 62
End: 2017-11-30

## 2017-12-06 ENCOUNTER — APPOINTMENT (OUTPATIENT)
Dept: ENDOCRINOLOGY | Facility: CLINIC | Age: 62
End: 2017-12-06
Payer: MEDICAID

## 2017-12-06 VITALS
OXYGEN SATURATION: 97 % | HEIGHT: 66 IN | WEIGHT: 190 LBS | HEART RATE: 70 BPM | RESPIRATION RATE: 16 BRPM | BODY MASS INDEX: 30.53 KG/M2 | DIASTOLIC BLOOD PRESSURE: 74 MMHG | SYSTOLIC BLOOD PRESSURE: 120 MMHG

## 2017-12-06 DIAGNOSIS — E11.65 TYPE 2 DIABETES MELLITUS WITH HYPERGLYCEMIA: ICD-10-CM

## 2017-12-06 LAB
GLUCOSE BLDC GLUCOMTR-MCNC: 110
HBA1C MFR BLD HPLC: 6.3

## 2017-12-06 PROCEDURE — 83036 HEMOGLOBIN GLYCOSYLATED A1C: CPT | Mod: QW

## 2017-12-06 PROCEDURE — 82962 GLUCOSE BLOOD TEST: CPT

## 2017-12-06 PROCEDURE — 99214 OFFICE O/P EST MOD 30 MIN: CPT | Mod: 25

## 2017-12-06 RX ORDER — INSULIN DEGLUDEC INJECTION 200 U/ML
200 INJECTION, SOLUTION SUBCUTANEOUS
Refills: 0 | Status: COMPLETED | COMMUNITY
Start: 2017-08-31 | End: 2017-12-06

## 2017-12-06 RX ORDER — NYSTATIN 100000 [USP'U]/G
100000 CREAM TOPICAL
Qty: 30 | Refills: 0 | Status: COMPLETED | COMMUNITY
Start: 2017-08-20 | End: 2017-12-06

## 2017-12-06 RX ORDER — SULFAMETHOXAZOLE AND TRIMETHOPRIM 800; 160 MG/1; MG/1
800-160 TABLET ORAL
Qty: 20 | Refills: 0 | Status: COMPLETED | COMMUNITY
Start: 2017-08-20 | End: 2017-12-06

## 2017-12-18 ENCOUNTER — RX RENEWAL (OUTPATIENT)
Age: 62
End: 2017-12-18

## 2018-01-02 ENCOUNTER — RX RENEWAL (OUTPATIENT)
Age: 63
End: 2018-01-02

## 2018-01-12 ENCOUNTER — RX RENEWAL (OUTPATIENT)
Age: 63
End: 2018-01-12

## 2018-01-12 RX ORDER — ROSUVASTATIN CALCIUM 10 MG/1
10 TABLET, FILM COATED ORAL
Qty: 30 | Refills: 5 | Status: DISCONTINUED | COMMUNITY
Start: 2017-12-06 | End: 2018-01-12

## 2018-01-12 RX ORDER — LANCETS 33 GAUGE
EACH MISCELLANEOUS
Qty: 4 | Refills: 2 | Status: ACTIVE | COMMUNITY
Start: 2018-01-12 | End: 1900-01-01

## 2018-01-12 RX ORDER — BLOOD SUGAR DIAGNOSTIC
STRIP MISCELLANEOUS
Qty: 400 | Refills: 1 | Status: DISCONTINUED | COMMUNITY
Start: 2017-08-27 | End: 2018-01-12

## 2018-01-12 RX ORDER — LANCETS 28 GAUGE
EACH MISCELLANEOUS
Qty: 400 | Refills: 1 | Status: DISCONTINUED | COMMUNITY
Start: 2017-08-27 | End: 2018-01-12

## 2018-01-12 RX ORDER — ATORVASTATIN CALCIUM 40 MG/1
40 TABLET, FILM COATED ORAL
Qty: 30 | Refills: 5 | Status: ACTIVE | COMMUNITY
Start: 2018-01-12 | End: 1900-01-01

## 2018-01-12 RX ORDER — BLOOD-GLUCOSE METER
W/DEVICE EACH MISCELLANEOUS
Qty: 1 | Refills: 0 | Status: ACTIVE | COMMUNITY
Start: 2018-01-12 | End: 1900-01-01

## 2018-03-23 ENCOUNTER — APPOINTMENT (OUTPATIENT)
Dept: ENDOCRINOLOGY | Facility: CLINIC | Age: 63
End: 2018-03-23
Payer: MEDICAID

## 2018-03-23 VITALS
RESPIRATION RATE: 16 BRPM | BODY MASS INDEX: 30.05 KG/M2 | HEART RATE: 76 BPM | WEIGHT: 187 LBS | DIASTOLIC BLOOD PRESSURE: 70 MMHG | HEIGHT: 66 IN | OXYGEN SATURATION: 99 % | SYSTOLIC BLOOD PRESSURE: 100 MMHG

## 2018-03-23 DIAGNOSIS — E78.5 HYPERLIPIDEMIA, UNSPECIFIED: ICD-10-CM

## 2018-03-23 DIAGNOSIS — E11.9 TYPE 2 DIABETES MELLITUS W/OUT COMPLICATIONS: ICD-10-CM

## 2018-03-23 PROCEDURE — 99214 OFFICE O/P EST MOD 30 MIN: CPT

## 2018-03-23 RX ORDER — SITAGLIPTIN 100 MG/1
100 TABLET, FILM COATED ORAL DAILY
Qty: 1 | Refills: 3 | Status: ACTIVE | COMMUNITY
Start: 2018-03-23 | End: 1900-01-01

## 2018-03-23 RX ORDER — METFORMIN ER 500 MG 500 MG/1
500 TABLET ORAL
Qty: 360 | Refills: 1 | Status: ACTIVE | COMMUNITY
Start: 2017-08-31 | End: 1900-01-01

## 2018-03-23 RX ORDER — BLOOD SUGAR DIAGNOSTIC
STRIP MISCELLANEOUS 3 TIMES DAILY
Qty: 4 | Refills: 1 | Status: ACTIVE | COMMUNITY
Start: 2018-01-12 | End: 1900-01-01

## 2018-03-28 LAB
CREAT SPEC-SCNC: 14 MG/DL
GLUCOSE BLDC GLUCOMTR-MCNC: 184
HBA1C MFR BLD HPLC: 7.4
MICROALBUMIN 24H UR DL<=1MG/L-MCNC: <0.3 MG/DL
MICROALBUMIN/CREAT 24H UR-RTO: NORMAL

## 2018-06-29 ENCOUNTER — APPOINTMENT (OUTPATIENT)
Dept: ENDOCRINOLOGY | Facility: CLINIC | Age: 63
End: 2018-06-29

## 2018-07-05 NOTE — ED PROVIDER NOTE - NS ED MD DISPO ISOLATION TYPES
Hospitalist Progress Note      PCP: No primary care provider on file. Date of Admission: 7/3/2018    Chief Complaint: R arm pain    Subjective: pt awake/alert    Medications:  Reviewed    Infusion Medications    sodium chloride 100 mL/hr at 07/04/18 2320     Scheduled Medications    sodium chloride flush  10 mL Intravenous 2 times per day    enoxaparin  40 mg Subcutaneous Daily    famotidine (PEPCID) injection  20 mg Intravenous BID    nicotine  1 patch Transdermal Daily    vancomycin  15 mg/kg Intravenous Q12H    vancomycin (VANCOCIN) intermittent dosing (placeholder)   Other RX Placeholder    piperacillin-tazobactam  3.375 g Intravenous Q8H     PRN Meds: sodium chloride flush, magnesium hydroxide, ondansetron, acetaminophen, morphine, oxyCODONE-acetaminophen, oxyCODONE-acetaminophen      Intake/Output Summary (Last 24 hours) at 07/05/18 1050  Last data filed at 07/04/18 2043   Gross per 24 hour   Intake              120 ml   Output                0 ml   Net              120 ml       Exam:    BP (!) 148/93   Pulse 77   Temp 97.9 °F (36.6 °C) (Oral)   Resp 18   Ht 5' 6\" (1.676 m)   Wt 150 lb (68 kg)   SpO2 99%   BMI 24.21 kg/m²     General appearance: No apparent distress, appears stated age and cooperative. HEENT: Pupils equal, round, and reactive to light. Conjunctivae/corneas clear. Neck: Supple, with full range of motion. No jugular venous distention. Trachea midline. Respiratory:  Normal respiratory effort. Clear to auscultation, bilaterally without Rales/Wheezes/Rhonchi. Cardiovascular: Regular rate and rhythm with normal S1/S2 without murmurs, rubs or gallops. Abdomen: Soft, non-tender, non-distended with normal bowel sounds. Musculoskeletal: R arm dressing in place . Skin: Skin color, texture, turgor normal.  No rashes or lesions. Neurologic:  Neurovascularly intact without any focal sensory/motor deficits.  Cranial nerves: II-XII intact, grossly non-focal.  Psychiatric: Alert and oriented, thought content appropriate, normal insight  Capillary Refill: Brisk,< 3 seconds   Peripheral Pulses: +2 palpable, equal bilaterally       Labs:   Recent Labs      07/03/18   2345  07/04/18 0724 07/05/18   0446   WBC  8.2  7.7  4.0*   HGB  13.2*  12.6*  13.1*   HCT  37.4*  36.5*  38.1*   PLT  147  135  139     Recent Labs      07/03/18   2345  07/04/18 0724 07/05/18   0446   NA  131*  134  141   K  4.1  3.5  3.7   CL  93*  98  108*   CO2  27  25  24   BUN  9  12  7   CREATININE  0.66*  0.60*  0.52*   CALCIUM  8.1*  7.9*  7.8*     Recent Labs      07/03/18   2345  07/04/18 0724 07/05/18 0446   AST  40  29  36   ALT  42*  34  36   BILITOT  0.9  0.6  0.3   ALKPHOS  82  74  69     Recent Labs      07/03/18   2345   INR  1.1     Recent Labs      07/03/18 2345   CKTOTAL  93   TROPONINI  <0.010       Urinalysis:    Lab Results   Component Value Date    NITRU Negative 07/04/2018    WBCUA 20-50 07/04/2018    BACTERIA Few 07/04/2018    RBCUA 0-2 07/04/2018    BLOODU Negative 07/04/2018    SPECGRAV 1.024 07/04/2018    GLUCOSEU 500 07/04/2018       Radiology:  XR ELBOW RIGHT (2 VIEWS)   Final Result   ELBOW SOFT TISSUE SWELLING. NO PLAIN RADIOGRAPHIC EVIDENCE OF ACUTE OSSEOUS OR ARTICULAR PATHOLOGY. Assessment/Plan:    Active Hospital Problems    Diagnosis Date Noted    Cellulitis [L03.90] 07/04/2018    Drug abuse [F19.10] 07/04/2018    Tobacco use [Z72.0] 07/04/2018         DVT Prophylaxis: lovenox  Diet: Diet NPO Effective Now Exceptions are: Sips with Meds, Ice Chips  Code Status: Full Code    PT/OT Eval Status:     Dispo -  R arm abscess- spontaneous drained, appreciate ortho/surgical consult. abscess culture pending, continue with IV atbs. Possible surgical intervention- po NPO per surgical service   Pain- continue with current orders   Viral hepatitis B/C- work up in progress.  ID on case    Heroin use- advice to stop, chemical dependency to see pt  Smoking- patch applied   Will follow along with consultants        Electronically signed by Kathrine Stanley MD on 7/5/2018 at 10:50 AM None

## 2021-05-22 NOTE — PROGRESS NOTE ADULT - PROVIDER SPECIALTY LIST ADULT
Endocrinology
Hospitalist
Infectious Disease
Infectious Disease
Spontaneous, unlabored and symmetrical
